# Patient Record
Sex: FEMALE | Race: WHITE | URBAN - METROPOLITAN AREA
[De-identification: names, ages, dates, MRNs, and addresses within clinical notes are randomized per-mention and may not be internally consistent; named-entity substitution may affect disease eponyms.]

---

## 2023-06-07 ENCOUNTER — TELEPHONE (OUTPATIENT)
Dept: OBGYN CLINIC | Facility: CLINIC | Age: 28
End: 2023-06-07

## 2023-06-28 ENCOUNTER — OFFICE VISIT (OUTPATIENT)
Dept: FAMILY MEDICINE CLINIC | Facility: CLINIC | Age: 28
End: 2023-06-28

## 2023-06-28 ENCOUNTER — LAB ENCOUNTER (OUTPATIENT)
Dept: LAB | Age: 28
End: 2023-06-28
Attending: FAMILY MEDICINE
Payer: COMMERCIAL

## 2023-06-28 VITALS
HEART RATE: 86 BPM | DIASTOLIC BLOOD PRESSURE: 69 MMHG | BODY MASS INDEX: 20.32 KG/M2 | HEIGHT: 64 IN | SYSTOLIC BLOOD PRESSURE: 101 MMHG | WEIGHT: 119 LBS

## 2023-06-28 DIAGNOSIS — E06.3 HYPOTHYROIDISM DUE TO HASHIMOTO'S THYROIDITIS: Primary | ICD-10-CM

## 2023-06-28 DIAGNOSIS — Z13.220 LIPID SCREENING: ICD-10-CM

## 2023-06-28 DIAGNOSIS — E06.3 HYPOTHYROIDISM DUE TO HASHIMOTO'S THYROIDITIS: ICD-10-CM

## 2023-06-28 DIAGNOSIS — Z13.21 ENCOUNTER FOR VITAMIN DEFICIENCY SCREENING: ICD-10-CM

## 2023-06-28 DIAGNOSIS — E03.8 HYPOTHYROIDISM DUE TO HASHIMOTO'S THYROIDITIS: ICD-10-CM

## 2023-06-28 DIAGNOSIS — N92.6 IRREGULAR MENSES: ICD-10-CM

## 2023-06-28 DIAGNOSIS — E03.8 HYPOTHYROIDISM DUE TO HASHIMOTO'S THYROIDITIS: Primary | ICD-10-CM

## 2023-06-28 DIAGNOSIS — Z13.1 DIABETES MELLITUS SCREENING: ICD-10-CM

## 2023-06-28 LAB
ALBUMIN SERPL-MCNC: 4.6 G/DL (ref 3.4–5)
ALBUMIN/GLOB SERPL: 1.4 {RATIO} (ref 1–2)
ALP LIVER SERPL-CCNC: 56 U/L
ALT SERPL-CCNC: 36 U/L
ANION GAP SERPL CALC-SCNC: 9 MMOL/L (ref 0–18)
AST SERPL-CCNC: 20 U/L (ref 15–37)
BILIRUB SERPL-MCNC: 1 MG/DL (ref 0.1–2)
BUN BLD-MCNC: 12 MG/DL (ref 7–18)
BUN/CREAT SERPL: 16.9 (ref 10–20)
CALCIUM BLD-MCNC: 9.9 MG/DL (ref 8.5–10.1)
CHLORIDE SERPL-SCNC: 106 MMOL/L (ref 98–112)
CHOLEST SERPL-MCNC: 190 MG/DL (ref ?–200)
CO2 SERPL-SCNC: 24 MMOL/L (ref 21–32)
CREAT BLD-MCNC: 0.71 MG/DL
EST. AVERAGE GLUCOSE BLD GHB EST-MCNC: 100 MG/DL (ref 68–126)
FASTING PATIENT LIPID ANSWER: YES
FASTING STATUS PATIENT QL REPORTED: YES
GFR SERPLBLD BASED ON 1.73 SQ M-ARVRAT: 118 ML/MIN/1.73M2 (ref 60–?)
GLOBULIN PLAS-MCNC: 3.2 G/DL (ref 2.8–4.4)
GLUCOSE BLD-MCNC: 74 MG/DL (ref 70–99)
HBA1C MFR BLD: 5.1 % (ref ?–5.7)
HDLC SERPL-MCNC: 68 MG/DL (ref 40–59)
LDLC SERPL CALC-MCNC: 115 MG/DL (ref ?–100)
NONHDLC SERPL-MCNC: 122 MG/DL (ref ?–130)
OSMOLALITY SERPL CALC.SUM OF ELEC: 286 MOSM/KG (ref 275–295)
POTASSIUM SERPL-SCNC: 3.8 MMOL/L (ref 3.5–5.1)
PROT SERPL-MCNC: 7.8 G/DL (ref 6.4–8.2)
SODIUM SERPL-SCNC: 139 MMOL/L (ref 136–145)
TRIGL SERPL-MCNC: 36 MG/DL (ref 30–149)
TSI SER-ACNC: 0.03 MIU/ML (ref 0.36–3.74)
VIT D+METAB SERPL-MCNC: 78.2 NG/ML (ref 30–100)
VLDLC SERPL CALC-MCNC: 6 MG/DL (ref 0–30)

## 2023-06-28 PROCEDURE — 3074F SYST BP LT 130 MM HG: CPT | Performed by: FAMILY MEDICINE

## 2023-06-28 PROCEDURE — 36415 COLL VENOUS BLD VENIPUNCTURE: CPT

## 2023-06-28 PROCEDURE — 80061 LIPID PANEL: CPT

## 2023-06-28 PROCEDURE — 84443 ASSAY THYROID STIM HORMONE: CPT

## 2023-06-28 PROCEDURE — 80053 COMPREHEN METABOLIC PANEL: CPT

## 2023-06-28 PROCEDURE — 99385 PREV VISIT NEW AGE 18-39: CPT | Performed by: FAMILY MEDICINE

## 2023-06-28 PROCEDURE — 3078F DIAST BP <80 MM HG: CPT | Performed by: FAMILY MEDICINE

## 2023-06-28 PROCEDURE — 3008F BODY MASS INDEX DOCD: CPT | Performed by: FAMILY MEDICINE

## 2023-06-28 PROCEDURE — 82306 VITAMIN D 25 HYDROXY: CPT

## 2023-06-28 PROCEDURE — 83036 HEMOGLOBIN GLYCOSYLATED A1C: CPT

## 2023-06-28 RX ORDER — LEVOTHYROXINE SODIUM 0.1 MG/1
100 TABLET ORAL
Qty: 90 TABLET | Refills: 3 | COMMUNITY
Start: 2023-06-28

## 2023-06-28 RX ORDER — MULTIVIT-MIN/IRON/FOLIC ACID/K 18-600-40
CAPSULE ORAL
COMMUNITY

## 2023-06-28 RX ORDER — LEVOTHYROXINE SODIUM 0.1 MG/1
100 TABLET ORAL
COMMUNITY
End: 2023-06-28 | Stop reason: ALTCHOICE

## 2023-06-29 DIAGNOSIS — E06.3 HYPOTHYROIDISM DUE TO HASHIMOTO'S THYROIDITIS: Primary | ICD-10-CM

## 2023-06-29 DIAGNOSIS — E03.8 HYPOTHYROIDISM DUE TO HASHIMOTO'S THYROIDITIS: Primary | ICD-10-CM

## 2023-06-29 RX ORDER — LEVOTHYROXINE SODIUM 88 UG/1
88 TABLET ORAL
Qty: 90 TABLET | Refills: 0 | Status: SHIPPED | OUTPATIENT
Start: 2023-06-29 | End: 2023-09-27

## 2023-07-24 ENCOUNTER — OFFICE VISIT (OUTPATIENT)
Dept: OBGYN CLINIC | Facility: CLINIC | Age: 28
End: 2023-07-24
Payer: COMMERCIAL

## 2023-07-24 VITALS
WEIGHT: 121 LBS | SYSTOLIC BLOOD PRESSURE: 100 MMHG | BODY MASS INDEX: 20.66 KG/M2 | HEIGHT: 64 IN | DIASTOLIC BLOOD PRESSURE: 60 MMHG

## 2023-07-24 DIAGNOSIS — Z31.69 ENCOUNTER FOR PRECONCEPTION CONSULTATION: Primary | ICD-10-CM

## 2023-07-24 NOTE — PROGRESS NOTES
New Patient GYN History and Physical  [facility]    CHIEF COMPLAINT:  Patient presents with:  Establish Care: Has some questions on if her medications are okay to take while trying to conceive. Fertility  HISTORY OF PRESENT ILLNESS:   Elizabeth Borja is a 33year old female [facility]  who presents for    Fertility planning    G0  Trying for pregnancy now - started this month. Has either been on birth control, or been \"pulling out\"    Last month of birth control was April. Periods have been irregular  First month - may - 2 days  For the first 2 months, had some random spotting - checo dark blood for a day or 2, never enough to need a tampon  Then 6 weeks before next period end of  - 4 days    Was on birth control x 11 years. Been with  x 10 years,  2 years ago  No problems with sex    No h/o STI    Last pap smear 2021 - normal, no h/o abnormals    Stopped spironolactone same time as stopped the birth control. Also stopped tretinoin. Endocrinologist had her on metformin for about 1.5 years.       PAST MEDICAL HISTORY:   Past Medical History:   Diagnosis Date    Acne     Hypothyroidism         PAST SURGICAL HISTORY:   Past Surgical History:   Procedure Laterality Date    Patient denies any surgical history          PAST OB HISTORY:  OB History    Para Term  AB Living   0 0 0 0 0 0   SAB IAB Ectopic Multiple Live Births   0 0 0 0 0       CURRENT MEDICATIONS:      Current Outpatient Medications:     Prenatal MV-Min-Fe Fum-FA-DHA (PRENATAL 1 OR), Take by mouth., Disp: , Rfl:     Cholecalciferol (VITAMIN D) 50 MCG ( UT) Oral Cap, Take by mouth., Disp: , Rfl:     Multiple Vitamin (MULTIVITAMIN OR), Take by mouth As Directed., Disp: , Rfl:     levothyroxine (SYNTHROID) 100 MCG Oral Tab, Take 1 tablet (100 mcg total) by mouth before breakfast., Disp: 90 tablet, Rfl: 3    levothyroxine (SYNTHROID) 88 MCG Oral Tab, Take 1 tablet (88 mcg total) by mouth before breakfast. (Patient not taking: Reported on 7/24/2023), Disp: 90 tablet, Rfl: 0    tretinoin 0.025 % External Cream, Apply to affected area at bedtime apply a pea sized amount to the face every night (Patient not taking: Reported on 7/24/2023), Disp: , Rfl:     ALLERGIES:    Sulfa Antibiotics       HIVES    SOCIAL HISTORY:  Social History    Socioeconomic History      Marital status:     Tobacco Use      Smoking status: Never      Smokeless tobacco: Never    Vaping Use      Vaping Use: Never used    Substance and Sexual Activity      Alcohol use: Yes        Comment: socially on weekends      Drug use: Never      Sexual activity: Yes    Other Topics      Concerns:        Blood Transfusions: No      FAMILY HISTORY:  Family History   Problem Relation Age of Onset    Hypertension Father     Other (hashimotos) Father     Other (Other) Mother     No Known Problems Sister      ASSESSMENTS:  PHYSICAL EXAM:   Patient's last menstrual period was 06/29/2023 (exact date). 07/24/23  1219   BP: 100/60   Weight: 121 lb (54.9 kg)   Height: 64\"     CONSTITUTIONAL: Awake, alert, cooperative, no apparent distress, and appears stated age   NECK: Supple, symmetrical, trachea midline, no adenopathy, thyroid symmetric, not enlarged and no tenderness  LUNGS: No excess work of breathing  MUSCULOSKELETAL: There is no redness, warmth, or swelling of the joints. Tone is normal.  NEUROLOGIC: Patient is awake, alert and oriented to name, place and time. Casual gait is normal.  SKIN: no bruising or bleeding and no rashes  PSYCHIATRIC: Behavior:  Appropriate  Mood:  appropriate  ASSESSMENT AND PLAN:  1. Encounter for preconception consultation  - reveiwed routine preconception guidance - period tracking, healthy diet, regular exercise, avoidaince of alcohol / other substances. Reviewed medications.  I recommend that she decrease the doze of synthroid (as her endocrinologist recommended.) she has stopped the other medications that are contra-indicated during pregnancy.   follow up as needed, or in 6-12 months if no success  Total face to face time was 30 min, more than 50% of the time was spent in counseling and/or coordination of care related to fertility Benji Lawton, DO

## 2023-08-31 ENCOUNTER — MED REC SCAN ONLY (OUTPATIENT)
Dept: FAMILY MEDICINE CLINIC | Facility: CLINIC | Age: 28
End: 2023-08-31

## 2023-08-31 PROBLEM — E03.8 HYPOTHYROIDISM DUE TO HASHIMOTO'S THYROIDITIS: Status: ACTIVE | Noted: 2023-08-31

## 2023-08-31 PROBLEM — E06.3 HYPOTHYROIDISM DUE TO HASHIMOTO'S THYROIDITIS: Status: ACTIVE | Noted: 2023-08-31

## 2023-08-31 PROBLEM — E04.2 MULTINODULAR THYROID: Status: ACTIVE | Noted: 2023-08-31

## 2023-09-13 ENCOUNTER — OFFICE VISIT (OUTPATIENT)
Dept: OBGYN CLINIC | Facility: CLINIC | Age: 28
End: 2023-09-13
Payer: COMMERCIAL

## 2023-09-13 ENCOUNTER — LAB ENCOUNTER (OUTPATIENT)
Dept: LAB | Facility: REFERENCE LAB | Age: 28
End: 2023-09-13
Attending: OBSTETRICS & GYNECOLOGY
Payer: COMMERCIAL

## 2023-09-13 VITALS
HEIGHT: 64 IN | BODY MASS INDEX: 21.45 KG/M2 | WEIGHT: 125.63 LBS | SYSTOLIC BLOOD PRESSURE: 112 MMHG | DIASTOLIC BLOOD PRESSURE: 70 MMHG

## 2023-09-13 DIAGNOSIS — Z32.01 PREGNANCY EXAMINATION OR TEST, POSITIVE RESULT: Primary | ICD-10-CM

## 2023-09-13 DIAGNOSIS — Z32.01 PREGNANCY EXAMINATION OR TEST, POSITIVE RESULT: ICD-10-CM

## 2023-09-13 LAB
B-HCG SERPL-ACNC: 3206 MIU/ML
CONTROL LINE PRESENT WITH A CLEAR BACKGROUND (YES/NO): YES YES/NO
KIT LOT #: NORMAL NUMERIC
PREGNANCY TEST, URINE: POSITIVE

## 2023-09-13 PROCEDURE — 3008F BODY MASS INDEX DOCD: CPT | Performed by: OBSTETRICS & GYNECOLOGY

## 2023-09-13 PROCEDURE — 3074F SYST BP LT 130 MM HG: CPT | Performed by: OBSTETRICS & GYNECOLOGY

## 2023-09-13 PROCEDURE — 84702 CHORIONIC GONADOTROPIN TEST: CPT

## 2023-09-13 PROCEDURE — 81025 URINE PREGNANCY TEST: CPT | Performed by: OBSTETRICS & GYNECOLOGY

## 2023-09-13 PROCEDURE — 36415 COLL VENOUS BLD VENIPUNCTURE: CPT

## 2023-09-13 PROCEDURE — 99214 OFFICE O/P EST MOD 30 MIN: CPT | Performed by: OBSTETRICS & GYNECOLOGY

## 2023-09-13 PROCEDURE — 3078F DIAST BP <80 MM HG: CPT | Performed by: OBSTETRICS & GYNECOLOGY

## 2023-09-15 ENCOUNTER — LAB ENCOUNTER (OUTPATIENT)
Dept: LAB | Age: 28
End: 2023-09-15
Attending: OBSTETRICS & GYNECOLOGY
Payer: COMMERCIAL

## 2023-09-15 DIAGNOSIS — Z32.01 PREGNANCY EXAMINATION OR TEST, POSITIVE RESULT: ICD-10-CM

## 2023-09-15 LAB — B-HCG SERPL-ACNC: 7112 MIU/ML

## 2023-09-15 PROCEDURE — 84702 CHORIONIC GONADOTROPIN TEST: CPT

## 2023-09-15 PROCEDURE — 36415 COLL VENOUS BLD VENIPUNCTURE: CPT

## 2023-09-18 ENCOUNTER — TELEPHONE (OUTPATIENT)
Dept: OBGYN CLINIC | Facility: CLINIC | Age: 28
End: 2023-09-18

## 2023-09-18 DIAGNOSIS — Z32.00 ENCOUNTER FOR CONFIRMATION OF PREGNANCY TEST RESULT WITH PHYSICAL EXAMINATION: Primary | ICD-10-CM

## 2023-09-21 ENCOUNTER — ULTRASOUND ENCOUNTER (OUTPATIENT)
Dept: OBGYN CLINIC | Facility: CLINIC | Age: 28
End: 2023-09-21
Payer: COMMERCIAL

## 2023-09-21 DIAGNOSIS — Z32.00 ENCOUNTER FOR CONFIRMATION OF PREGNANCY TEST RESULT WITH PHYSICAL EXAMINATION: ICD-10-CM

## 2023-09-21 PROCEDURE — 76817 TRANSVAGINAL US OBSTETRIC: CPT | Performed by: OBSTETRICS & GYNECOLOGY

## 2023-09-26 DIAGNOSIS — O36.80X0 ENCOUNTER TO DETERMINE FETAL VIABILITY OF PREGNANCY, SINGLE OR UNSPECIFIED FETUS: Primary | ICD-10-CM

## 2023-10-05 ENCOUNTER — ULTRASOUND ENCOUNTER (OUTPATIENT)
Dept: OBGYN CLINIC | Facility: CLINIC | Age: 28
End: 2023-10-05
Payer: COMMERCIAL

## 2023-10-05 DIAGNOSIS — O36.80X0 ENCOUNTER TO DETERMINE FETAL VIABILITY OF PREGNANCY, SINGLE OR UNSPECIFIED FETUS: ICD-10-CM

## 2023-10-05 PROCEDURE — 76817 TRANSVAGINAL US OBSTETRIC: CPT | Performed by: OBSTETRICS & GYNECOLOGY

## 2023-10-26 ENCOUNTER — NURSE ONLY (OUTPATIENT)
Dept: OBGYN CLINIC | Facility: CLINIC | Age: 28
End: 2023-10-26

## 2023-10-26 ENCOUNTER — LAB ENCOUNTER (OUTPATIENT)
Dept: LAB | Facility: REFERENCE LAB | Age: 28
End: 2023-10-26
Attending: OBSTETRICS & GYNECOLOGY

## 2023-10-26 DIAGNOSIS — E03.8 HYPOTHYROIDISM DUE TO HASHIMOTO'S THYROIDITIS: ICD-10-CM

## 2023-10-26 DIAGNOSIS — E06.3 HYPOTHYROIDISM DUE TO HASHIMOTO'S THYROIDITIS: ICD-10-CM

## 2023-10-26 DIAGNOSIS — Z34.01 ENCOUNTER FOR SUPERVISION OF NORMAL FIRST PREGNANCY IN FIRST TRIMESTER: ICD-10-CM

## 2023-10-26 DIAGNOSIS — Z34.01 ENCOUNTER FOR SUPERVISION OF NORMAL FIRST PREGNANCY IN FIRST TRIMESTER: Primary | ICD-10-CM

## 2023-10-26 LAB
ANTIBODY SCREEN: NEGATIVE
BASOPHILS # BLD AUTO: 0.06 X10(3) UL (ref 0–0.2)
BASOPHILS NFR BLD AUTO: 0.7 %
DEPRECATED RDW RBC AUTO: 39.8 FL (ref 35.1–46.3)
EOSINOPHIL # BLD AUTO: 0.12 X10(3) UL (ref 0–0.7)
EOSINOPHIL NFR BLD AUTO: 1.4 %
ERYTHROCYTE [DISTWIDTH] IN BLOOD BY AUTOMATED COUNT: 12.6 % (ref 11–15)
HBV SURFACE AG SER-ACNC: <0.1 [IU]/L
HBV SURFACE AG SERPL QL IA: NONREACTIVE
HCT VFR BLD AUTO: 37.4 %
HCV AB SERPL QL IA: NONREACTIVE
HGB BLD-MCNC: 12.8 G/DL
IMM GRANULOCYTES # BLD AUTO: 0.02 X10(3) UL (ref 0–1)
IMM GRANULOCYTES NFR BLD: 0.2 %
LYMPHOCYTES # BLD AUTO: 2.17 X10(3) UL (ref 1–4)
LYMPHOCYTES NFR BLD AUTO: 25.4 %
MCH RBC QN AUTO: 29.5 PG (ref 26–34)
MCHC RBC AUTO-ENTMCNC: 34.2 G/DL (ref 31–37)
MCV RBC AUTO: 86.2 FL
MONOCYTES # BLD AUTO: 0.51 X10(3) UL (ref 0.1–1)
MONOCYTES NFR BLD AUTO: 6 %
NEUTROPHILS # BLD AUTO: 5.68 X10 (3) UL (ref 1.5–7.7)
NEUTROPHILS # BLD AUTO: 5.68 X10(3) UL (ref 1.5–7.7)
NEUTROPHILS NFR BLD AUTO: 66.3 %
PLATELET # BLD AUTO: 233 10(3)UL (ref 150–450)
RBC # BLD AUTO: 4.34 X10(6)UL
RH BLOOD TYPE: POSITIVE
RUBV IGG SER QL: POSITIVE
RUBV IGG SER-ACNC: 60.2 IU/ML (ref 10–?)
T4 FREE SERPL-MCNC: 1.2 NG/DL (ref 0.8–1.7)
TSI SER-ACNC: 2.3 MIU/ML (ref 0.36–3.74)
WBC # BLD AUTO: 8.6 X10(3) UL (ref 4–11)

## 2023-10-26 PROCEDURE — 83020 HEMOGLOBIN ELECTROPHORESIS: CPT

## 2023-10-26 PROCEDURE — 84439 ASSAY OF FREE THYROXINE: CPT

## 2023-10-26 PROCEDURE — 83021 HEMOGLOBIN CHROMOTOGRAPHY: CPT

## 2023-10-26 PROCEDURE — 85025 COMPLETE CBC W/AUTO DIFF WBC: CPT

## 2023-10-26 PROCEDURE — 87086 URINE CULTURE/COLONY COUNT: CPT

## 2023-10-26 PROCEDURE — 36415 COLL VENOUS BLD VENIPUNCTURE: CPT

## 2023-10-26 PROCEDURE — 84443 ASSAY THYROID STIM HORMONE: CPT

## 2023-10-26 PROCEDURE — 86901 BLOOD TYPING SEROLOGIC RH(D): CPT

## 2023-10-26 PROCEDURE — 86900 BLOOD TYPING SEROLOGIC ABO: CPT

## 2023-10-26 PROCEDURE — 87389 HIV-1 AG W/HIV-1&-2 AB AG IA: CPT

## 2023-10-26 PROCEDURE — 86803 HEPATITIS C AB TEST: CPT

## 2023-10-26 PROCEDURE — 86850 RBC ANTIBODY SCREEN: CPT

## 2023-10-26 PROCEDURE — 86780 TREPONEMA PALLIDUM: CPT

## 2023-10-26 PROCEDURE — 86762 RUBELLA ANTIBODY: CPT

## 2023-10-26 PROCEDURE — 87340 HEPATITIS B SURFACE AG IA: CPT

## 2023-10-26 RX ORDER — LEVOTHYROXINE SODIUM 88 UG/1
88 TABLET ORAL
COMMUNITY

## 2023-10-26 RX ORDER — CLINDAMYCIN PHOSPHATE 10 MG/G
GEL TOPICAL
COMMUNITY
Start: 2023-09-21

## 2023-10-26 RX ORDER — AZELAIC ACID 0.15 G/G
GEL TOPICAL
COMMUNITY
Start: 2023-10-17

## 2023-10-26 NOTE — PROGRESS NOTES
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 Pregnancy Confirmation apt with: RAGHAVENDRA    LMP: 2023    US: 10/05/2023 at 8w 1d    Working JONAS:  05/15/2024    Pre  BMI:   21.56    Medical Hx significant for: Hypothyroid    Obstetrical Hx significant for: 1st pregnancy    Surgical Hx significant for: denies    EPDS score: 2    OUD Screening: Patient has answered NO to 5p questions and has no  risk factors. Patient given \"What Pregnant Women Need to Know\" handout. Educational material reviewed with patient: Prenatal care, nutrition, weight gain recommendations, travel, exercise, intercourse, pregnancy changes, safe medications, pregnancy and work, fetal movement, labor and  labor, warning signs, food safety, tdap, cord blood, breastfeeding, circumcision, and Group B strep. Pt agrees to blood transfusion if needed: yes    PN labs ordered     Optional genetic screening discussed. Pt desires [place] and Foresight carrier screen:    [facility] Media Policy: Reviewed and verbalized understanding.      NOB appt: 2023 with RD    Lab appt: today    Vaccines: received flu and COVID    ASA protocol :  n/a    SODH:  completed

## 2023-10-27 LAB — T PALLIDUM AB SER QL: NEGATIVE

## 2023-11-01 LAB
HGB A2 MFR BLD: 2.7 % (ref 1.5–3.5)
HGB PNL BLD ELPH: 97.3 % (ref 95.5–100)

## 2023-11-04 ENCOUNTER — INITIAL PRENATAL (OUTPATIENT)
Dept: OBGYN CLINIC | Facility: CLINIC | Age: 28
End: 2023-11-04
Payer: COMMERCIAL

## 2023-11-04 VITALS
BODY MASS INDEX: 21 KG/M2 | DIASTOLIC BLOOD PRESSURE: 68 MMHG | HEIGHT: 64 IN | SYSTOLIC BLOOD PRESSURE: 92 MMHG | WEIGHT: 123 LBS

## 2023-11-04 DIAGNOSIS — Z34.00 SUPERVISION OF NORMAL FIRST PREGNANCY, ANTEPARTUM: Primary | ICD-10-CM

## 2023-11-04 PROCEDURE — 3074F SYST BP LT 130 MM HG: CPT | Performed by: OBSTETRICS & GYNECOLOGY

## 2023-11-04 PROCEDURE — 3078F DIAST BP <80 MM HG: CPT | Performed by: OBSTETRICS & GYNECOLOGY

## 2023-11-04 PROCEDURE — 3008F BODY MASS INDEX DOCD: CPT | Performed by: OBSTETRICS & GYNECOLOGY

## 2023-11-04 NOTE — PROGRESS NOTES
[facility]  Obstetrics and Gynecology  History & Physical    CC: Patient is here to establish prenatal care     Subjective:     HPI:  Bailey Kim is a 33year old [facility] female at [address] [place] [address] who presents today to establish prenatal care. Patient reports some nausea still lingering. Denies vaginal bleeding, abdominal/pelvic pain. Slightly constipated. + stuffy nose but no other symptoms that make her think she is sick. LMP: Patient's last menstrual period was 2023 (exact date).   JONAS:  Estimated Date of Delivery: 5/15/24    OB:  OB History    Para Term  AB Living   1 0 0 0 0 0   SAB IAB Ectopic Multiple Live Births   0 0 0 0 0      # Outcome Date GA Lbr Marco Antonio/2nd Weight Sex Delivery Anes PTL Lv   1 Current                  GYN:   Menarche: 14 yrs old (2023 12:22 PM)  Period Cycle (Days): irregular since geting off OCPs (2023 12:22 PM)  Period Duration (Days): 2-4 days (2023 12:22 PM)  Period Flow: light-spotting (2023 12:22 PM)  Date When Birth Control Last Used: OCPs last used in 2023 (2023 12:22 PM)  Hx Prior Abnormal Pap: No (2023 12:22 PM)  Pap Date: 12/15/21 (2023 12:22 PM)  Pap Result Notes: WNL (2023 12:22 PM)    PMH:   Past Medical History:   Diagnosis Date    Acne     Hypothyroidism        PSH:    Past Surgical History:   Procedure Laterality Date    PATIENT DENIES ANY SURGICAL HISTORY         MEDS:    Current Outpatient Medications:     Azelaic Acid 15 % External Gel, , Disp: , Rfl:     levothyroxine 88 MCG Oral Tab, Take 1 tablet (88 mcg total) by mouth before breakfast., Disp: , Rfl:     Prenatal MV-Min-Fe Fum-FA-DHA (PRENATAL 1 OR), Take by mouth., Disp: , Rfl:     Cholecalciferol (VITAMIN D) 50 MCG (2000 UT) Oral Cap, Take by mouth., Disp: , Rfl:     Clindamycin Phosphate 1 % External Gel, Apply to face as a spot treatment every morning (Patient not taking: Reported on 2023), Disp: , Rfl:     levothyroxine (SYNTHROID) 100 MCG Oral Tab, Take 88 mcg by mouth before breakfast. (Patient not taking: Reported on 11/4/2023), Disp: 90 tablet, Rfl: 3    Allergies:      Sulfa Antibiotics       HIVES    Immunizations:    Immunization History  Administered            Date(s) Administered    >=3 YRS TRI  MULTIDOSE VIAL (74049) FLU CLINIC                          10/12/2013      DTAP INFANRIX         03/30/1994 05/26/1994 08/05/1994 07/26/1995 03/12/1999      FLUZONE 6 months and older PFS 0.5 ml (17547)                          10/11/2017  09/10/2018  09/11/2018                            12/08/2020  10/02/2021  03/04/2022      HEP A,Ped/Adol,(2 Dose)                          01/25/2007 08/01/2008      HEP B, Adult          07/26/1995 08/30/1995 01/31/1996      HIB (HbOC)            04/17/1995      HPV (Gardasil)        08/01/2008  10/17/2008  02/20/2009      IPV                   03/30/1994 05/26/1994 08/05/1994 03/12/1999      Influenza             10/09/2015  10/24/2019      MMR                   04/17/1995 03/12/1999      Meningococcal-Menveo 2month-55yr                          10/12/2005  08/18/2011      TDAP                  01/16/2006 06/28/2012 03/04/2022      Varicella             07/26/1995 01/25/2007      Family Hx:      Family History   Problem Relation Age of Onset    Thyroid Disorder Father     Hypertension Father     Thyroid Disorder Mother     Osteoporosis Maternal Grandmother     Obesity Maternal Grandfather     No Known Problems Paternal Grandmother     Cancer Paternal Grandfather     Other (lymphoma) Paternal Grandfather     No Known Problems Sister        SocialHx:        Social History     Socioeconomic History    Marital status:    Tobacco Use    Smoking status: Never     Passive exposure: Never    Smokeless tobacco: Never   Vaping Use    Vaping Use: Never used   Substance and Sexual Activity    Alcohol use: Not Currently     Comment: socially on weekends    Drug use: Never    Sexual activity: Yes   Other Topics Concern    Blood Transfusions No   Social History Narrative    No abuse   Social Determinants of Health  Financial Resource Strain: Low Risk  (10/30/2023)      Financial Resource Strain          Difficulty of Paying Living Expenses: Not hard at all          Med Affordability: No  Food Insecurity: No Food Insecurity (10/30/2023)      Food Insecurity          Food Insecurity: Never true  Transportation Needs: No Transportation Needs (10/30/2023)      Transportation Needs          Lack of Transportation: No  Stress: No Stress Concern Present (10/30/2023)      Stress          Feeling of Stress : No  Housing Stability: Low Risk  (10/30/2023)      Housing Stability          Housing Instability: No       Review of Systems:  General: no complaints  Eyes: no complaints  Respiratory: no complaints  Cardiovascular: no complaints  GI: no complaints  : no complaints See HPI  Hematological/lymphatic: no complaints  Breast: no complaints  Psychiatric: no complaints  Endocrine:no complaints  Neurological: no complaints  Immunological: no complaints  Musculoskeletal:no complaints    Objective:      23  0908   BP: 92/68       GENERAL: well developed, well nourished, in no apparent distress, alert and orientated X 3  PSYCH: mood and affect stable   SKIN: no rashes, no lesions  HEENT: normal  LUNGS: respiration unlabored  CARDIOVASCULAR: no peripheral edema or varicosities, skin warm and dry    ABDOMEN: Soft, non distended; non tender, no masses  EXTREMITIES:  Nontender without edema    Fetal Well Being Assessment:    Bedside doppler: 162 bpm    Assessment/Plan:     Prasanth Humphrey is a 33year old  female at 14w4d by 1st trimester US not c/w LMP who presents today for New OB appt.     Patient Active Problem List:     Hypothyroidism due to Hashimoto's thyroiditis     Multinodular thyroid        Prenatal care  - prenatal labs reviewed  - genetic testing collected last week and pending.  - continue PNV daily         RTC in 4 weeks     Cinthia Pagan DO  [address] [facility]

## 2023-11-06 ENCOUNTER — TELEPHONE (OUTPATIENT)
Dept: OBGYN CLINIC | Facility: CLINIC | Age: 28
End: 2023-11-06

## 2023-11-13 ENCOUNTER — TELEPHONE (OUTPATIENT)
Dept: OBGYN CLINIC | Facility: CLINIC | Age: 28
End: 2023-11-13

## 2023-11-13 PROBLEM — Z14.8 GENETIC CARRIER: Status: ACTIVE | Noted: 2023-11-13

## 2023-11-13 NOTE — TELEPHONE ENCOUNTER
Called pt informed of + Foresight carrier screen Nephrotic Syndrome, informed to have FOB tested. Sent pt inform via email from [address] and pt agrees.

## 2023-11-17 PROBLEM — Z13.79 GENETIC TESTING: Status: ACTIVE | Noted: 2023-11-17

## 2023-12-02 ENCOUNTER — ROUTINE PRENATAL (OUTPATIENT)
Dept: OBGYN CLINIC | Facility: CLINIC | Age: 28
End: 2023-12-02
Payer: COMMERCIAL

## 2023-12-02 VITALS
HEIGHT: 64 IN | WEIGHT: 126 LBS | DIASTOLIC BLOOD PRESSURE: 58 MMHG | SYSTOLIC BLOOD PRESSURE: 92 MMHG | BODY MASS INDEX: 21.51 KG/M2

## 2023-12-02 DIAGNOSIS — Z34.00 SUPERVISION OF NORMAL FIRST PREGNANCY, ANTEPARTUM: Primary | ICD-10-CM

## 2023-12-02 PROCEDURE — 3078F DIAST BP <80 MM HG: CPT | Performed by: OBSTETRICS & GYNECOLOGY

## 2023-12-02 PROCEDURE — 3008F BODY MASS INDEX DOCD: CPT | Performed by: OBSTETRICS & GYNECOLOGY

## 2023-12-02 PROCEDURE — 3074F SYST BP LT 130 MM HG: CPT | Performed by: OBSTETRICS & GYNECOLOGY

## 2023-12-02 NOTE — PROGRESS NOTES
33year old  at 16w3d     Contractions: No  VB: No  LOF: No  Fetal movement: Not yet    Some constipation. Only occasional nausea.     Return OB  Pre- Care: UTD.   - will sched anatomy US 20+ wks EGA  - genetic testing for  - results still pending  Patient Active Problem List   Diagnosis    Hypothyroidism due to Hashimoto's thyroiditis    Multinodular thyroid    Carrier of nephrotic syndrome    Negative prequel  pregnancy       - Return to clinic in: 4 weeks    [place]at[place]zahira Pickering DO

## 2023-12-04 ENCOUNTER — TELEPHONE (OUTPATIENT)
Dept: OBGYN CLINIC | Facility: CLINIC | Age: 28
End: 2023-12-04

## 2023-12-04 NOTE — TELEPHONE ENCOUNTER
Partner negative for Nephrotic Syndrome, NPHS2-related,. Called Anne Godwin informed of negative foresight carrier screen. He was tested for  Nephrotic syndrome, NPHS2-related. He will relay the information to pt.

## 2023-12-29 RX ORDER — LEVOTHYROXINE SODIUM 88 UG/1
88 TABLET ORAL DAILY
Qty: 90 TABLET | Refills: 0 | OUTPATIENT
Start: 2023-12-29

## 2023-12-30 NOTE — TELEPHONE ENCOUNTER
Duplicate Refill Request / Refill too soon.   Per med list last ref synthroid 100 mcg on 6-28-23 # 90 + 3    levothyroxine (SYNTHROID) 100 MCG Oral Tab 90 tablet 3 6/28/2023

## 2024-01-04 ENCOUNTER — ULTRASOUND ENCOUNTER (OUTPATIENT)
Dept: OBGYN CLINIC | Facility: CLINIC | Age: 29
End: 2024-01-04
Payer: COMMERCIAL

## 2024-01-04 ENCOUNTER — ROUTINE PRENATAL (OUTPATIENT)
Dept: OBGYN CLINIC | Facility: CLINIC | Age: 29
End: 2024-01-04
Payer: COMMERCIAL

## 2024-01-04 VITALS
WEIGHT: 130 LBS | DIASTOLIC BLOOD PRESSURE: 58 MMHG | SYSTOLIC BLOOD PRESSURE: 98 MMHG | BODY MASS INDEX: 22.2 KG/M2 | HEIGHT: 64 IN

## 2024-01-04 DIAGNOSIS — Z34.00 SUPERVISION OF NORMAL FIRST PREGNANCY, ANTEPARTUM: ICD-10-CM

## 2024-01-04 DIAGNOSIS — Z34.00 SUPERVISION OF NORMAL FIRST PREGNANCY, ANTEPARTUM: Primary | ICD-10-CM

## 2024-01-04 PROCEDURE — 3074F SYST BP LT 130 MM HG: CPT | Performed by: OBSTETRICS & GYNECOLOGY

## 2024-01-04 PROCEDURE — 3078F DIAST BP <80 MM HG: CPT | Performed by: OBSTETRICS & GYNECOLOGY

## 2024-01-04 PROCEDURE — 76805 OB US >/= 14 WKS SNGL FETUS: CPT | Performed by: OBSTETRICS & GYNECOLOGY

## 2024-01-04 PROCEDURE — 3008F BODY MASS INDEX DOCD: CPT | Performed by: OBSTETRICS & GYNECOLOGY

## 2024-01-04 RX ORDER — PHENOL 1.4 %
600 AEROSOL, SPRAY (ML) MUCOUS MEMBRANE
COMMUNITY

## 2024-01-04 RX ORDER — VALACYCLOVIR HYDROCHLORIDE 1 G/1
TABLET, FILM COATED ORAL
COMMUNITY
Start: 2023-12-01

## 2024-01-04 RX ORDER — LEVOTHYROXINE SODIUM 88 UG/1
88 TABLET ORAL
Qty: 84 TABLET | Refills: 3 | Status: SHIPPED | OUTPATIENT
Start: 2024-01-04

## 2024-01-04 NOTE — PROGRESS NOTES
28 year old  at 21w1d     Contractions: No  VB: No  LOF: No  Fetal movement: yes      Return OB  Pre- Care: UTD.   - anatomy US today WNL  - plan for 1 hr gtt / cbc next appt  - refills of synthroid sent - check TSH with labs next appt    Patient Active Problem List   Diagnosis    Hypothyroidism due to Hashimoto's thyroiditis    Multinodular thyroid    Carrier of nephrotic syndrome    Negative prequel  pregnancy       - Return to clinic in: 4 weeks    Luisa Mcintyre DO

## 2024-01-05 ENCOUNTER — TELEPHONE (OUTPATIENT)
Dept: OBGYN CLINIC | Facility: CLINIC | Age: 29
End: 2024-01-05

## 2024-01-05 ENCOUNTER — PATIENT MESSAGE (OUTPATIENT)
Dept: OBGYN CLINIC | Facility: CLINIC | Age: 29
End: 2024-01-05

## 2024-01-05 NOTE — TELEPHONE ENCOUNTER
From: Ifrah Perea  To: Luisa Mcintyre  Sent: 1/5/2024 8:54 AM CST  Subject: FMLA Paperwork    Hello Dr. Mcintyre,     Attached is my FMLA paperwork that must be completed by 1/25/24 from my employer. Can you please ensure that this is completed. Thank you!     Below are instructions from my HR department:     o Your health care provider needs to complete all of Section II, pages 2, 3 and 4, including your health care provider’s signature and signature date.  o Please return all four pages of the completed form [id number] to me directly or to my HR department by email ([email or web address]) or fax it directly to [phone].    Please let me know if you have any questions or need any additional information.     -Ifrah Perea

## 2024-01-06 NOTE — TELEPHONE ENCOUNTER
FMLA forms received [employer] message- Logged for processing MyChart sent to patient for auth.

## 2024-01-23 NOTE — TELEPHONE ENCOUNTER
Dr. Mcintyre     *The ACKNOWLEDGE button has been moved to the top right ribbon*    Please sign off on form if you agree to: TEODORA  Starting JONAS 05/15/24 Endin-8 wks delivery recovery time / 1-12 wks maternity leave  (place your signature on the first page only)    -From your Inbasket, Highlight the patient and click Chart   -Double click the 24 Forms Completion telephone encounter  -Scroll down to the Media section   -Click the blue Hyperlink: TEODORA Mcintyre 24  -Click Acknowledge located in the top right ribbon/menu   -Drag the mouse into the blank space of the document and a + sign will appear. Left click to   electronically sign the document.     Thank you,     Keely

## 2024-02-01 ENCOUNTER — LAB ENCOUNTER (OUTPATIENT)
Dept: LAB | Facility: HOSPITAL | Age: 29
End: 2024-02-01
Attending: OBSTETRICS & GYNECOLOGY
Payer: COMMERCIAL

## 2024-02-01 ENCOUNTER — ROUTINE PRENATAL (OUTPATIENT)
Dept: OBGYN CLINIC | Facility: CLINIC | Age: 29
End: 2024-02-01
Payer: COMMERCIAL

## 2024-02-01 VITALS — SYSTOLIC BLOOD PRESSURE: 96 MMHG | BODY MASS INDEX: 23 KG/M2 | WEIGHT: 131.63 LBS | DIASTOLIC BLOOD PRESSURE: 58 MMHG

## 2024-02-01 DIAGNOSIS — Z34.00 SUPERVISION OF NORMAL FIRST PREGNANCY, ANTEPARTUM: ICD-10-CM

## 2024-02-01 DIAGNOSIS — Z34.00 SUPERVISION OF NORMAL FIRST PREGNANCY, ANTEPARTUM: Primary | ICD-10-CM

## 2024-02-01 LAB
DEPRECATED RDW RBC AUTO: 41.7 FL (ref 35.1–46.3)
ERYTHROCYTE [DISTWIDTH] IN BLOOD BY AUTOMATED COUNT: 12.9 % (ref 11–15)
GLUCOSE 1H P GLC SERPL-MCNC: 119 MG/DL
HCT VFR BLD AUTO: 36.5 %
HGB BLD-MCNC: 12.4 G/DL
MCH RBC QN AUTO: 30.1 PG (ref 26–34)
MCHC RBC AUTO-ENTMCNC: 34 G/DL (ref 31–37)
MCV RBC AUTO: 88.6 FL
PLATELET # BLD AUTO: 196 10(3)UL (ref 150–450)
RBC # BLD AUTO: 4.12 X10(6)UL
TSI SER-ACNC: 2.14 MIU/ML (ref 0.55–4.78)
WBC # BLD AUTO: 8.6 X10(3) UL (ref 4–11)

## 2024-02-01 PROCEDURE — 85027 COMPLETE CBC AUTOMATED: CPT

## 2024-02-01 PROCEDURE — 82950 GLUCOSE TEST: CPT

## 2024-02-01 PROCEDURE — 84443 ASSAY THYROID STIM HORMONE: CPT

## 2024-02-01 PROCEDURE — 36415 COLL VENOUS BLD VENIPUNCTURE: CPT

## 2024-02-01 NOTE — PROGRESS NOTES
29 year old  at 25w1d   Was in [place] since last visit and reported gastroenteritis episode which lasted less than 1 day.  All symptoms resolved now.    Contractions: No  VB: No  LOF: No  Fetal movement: yes      Return OB  Pre-Radha Care: UTD. Tdap next visit.  - plan for 1 hr gtt / cbc today  - check TSH      Patient Active Problem List   Diagnosis    Hypothyroidism due to Hashimoto's thyroiditis    Multinodular thyroid    Carrier of nephrotic syndrome - partner negative    Negative prequel  pregnancy       - Return to clinic in: 4 weeks    Tina Mars MD

## 2024-02-27 ENCOUNTER — TELEPHONE (OUTPATIENT)
Dept: OBGYN CLINIC | Facility: CLINIC | Age: 29
End: 2024-02-27

## 2024-03-02 ENCOUNTER — ROUTINE PRENATAL (OUTPATIENT)
Dept: OBGYN CLINIC | Facility: CLINIC | Age: 29
End: 2024-03-02
Payer: COMMERCIAL

## 2024-03-02 DIAGNOSIS — E06.3 HYPOTHYROIDISM DUE TO HASHIMOTO'S THYROIDITIS: ICD-10-CM

## 2024-03-02 DIAGNOSIS — E03.8 HYPOTHYROIDISM DUE TO HASHIMOTO'S THYROIDITIS: ICD-10-CM

## 2024-03-02 DIAGNOSIS — Z36.9 UNSPECIFIED ANTENATAL SCREENING (HCC): ICD-10-CM

## 2024-03-02 DIAGNOSIS — Z34.00 SUPERVISION OF NORMAL FIRST PREGNANCY, ANTEPARTUM (HCC): Primary | ICD-10-CM

## 2024-03-02 NOTE — PROGRESS NOTES
Doing well. No OB complaints. +FM.   TDAP today.   TSH 3rd trimester for next visit.   Reviewed baby mindfulness.     ELANA 3 weeks with growth ultrasound.     Dr. Zechariah De Los Santos MD    [facility]     This note was created by [employer] voice recognition. Errors in content may be related to improper recognition by the system; efforts to review and correct have been done but errors may still exist. Please be advised the primary purpose of this note is for me to communicate medical care. Standard sentence structure is not always used. Medical terminology and medical abbreviations may be used. There may be grammatical, typographical, and automated fill ins that may have errors missed in proofreading.

## 2024-03-20 ENCOUNTER — LAB ENCOUNTER (OUTPATIENT)
Dept: LAB | Facility: REFERENCE LAB | Age: 29
End: 2024-03-20
Attending: OBSTETRICS & GYNECOLOGY
Payer: COMMERCIAL

## 2024-03-20 ENCOUNTER — ULTRASOUND ENCOUNTER (OUTPATIENT)
Dept: OBGYN CLINIC | Facility: CLINIC | Age: 29
End: 2024-03-20
Payer: COMMERCIAL

## 2024-03-20 ENCOUNTER — ROUTINE PRENATAL (OUTPATIENT)
Dept: OBGYN CLINIC | Facility: CLINIC | Age: 29
End: 2024-03-20
Payer: COMMERCIAL

## 2024-03-20 VITALS
SYSTOLIC BLOOD PRESSURE: 92 MMHG | WEIGHT: 131 LBS | HEIGHT: 64 IN | DIASTOLIC BLOOD PRESSURE: 60 MMHG | BODY MASS INDEX: 22.36 KG/M2

## 2024-03-20 DIAGNOSIS — E06.3 HYPOTHYROIDISM DUE TO HASHIMOTO'S THYROIDITIS: ICD-10-CM

## 2024-03-20 DIAGNOSIS — E03.8 HYPOTHYROIDISM DUE TO HASHIMOTO'S THYROIDITIS: ICD-10-CM

## 2024-03-20 DIAGNOSIS — Z36.9 UNSPECIFIED ANTENATAL SCREENING (HCC): ICD-10-CM

## 2024-03-20 DIAGNOSIS — Z34.00 SUPERVISION OF NORMAL FIRST PREGNANCY, ANTEPARTUM (HCC): ICD-10-CM

## 2024-03-20 DIAGNOSIS — Z34.00 SUPERVISION OF NORMAL FIRST PREGNANCY, ANTEPARTUM (HCC): Primary | ICD-10-CM

## 2024-03-20 PROCEDURE — 84439 ASSAY OF FREE THYROXINE: CPT

## 2024-03-20 PROCEDURE — 36415 COLL VENOUS BLD VENIPUNCTURE: CPT

## 2024-03-20 PROCEDURE — 76816 OB US FOLLOW-UP PER FETUS: CPT | Performed by: OBSTETRICS & GYNECOLOGY

## 2024-03-20 PROCEDURE — 84443 ASSAY THYROID STIM HORMONE: CPT

## 2024-03-20 NOTE — PROGRESS NOTES
30 y/o  at 32 W gestation.   +FM, no vaginal bleeding, no LOF.   Had normal usn for growth today.

## 2024-03-21 LAB
T4 FREE SERPL-MCNC: 1.5 NG/DL (ref 0.8–1.7)
TSI SER-ACNC: 1.43 MIU/ML (ref 0.55–4.78)

## 2024-04-02 ENCOUNTER — ROUTINE PRENATAL (OUTPATIENT)
Dept: OBGYN CLINIC | Facility: CLINIC | Age: 29
End: 2024-04-02
Payer: COMMERCIAL

## 2024-04-02 VITALS
HEIGHT: 64 IN | DIASTOLIC BLOOD PRESSURE: 68 MMHG | SYSTOLIC BLOOD PRESSURE: 108 MMHG | WEIGHT: 140.88 LBS | BODY MASS INDEX: 24.05 KG/M2

## 2024-04-02 DIAGNOSIS — Z36.9 UNSPECIFIED ANTENATAL SCREENING (HCC): Primary | ICD-10-CM

## 2024-04-02 NOTE — PROGRESS NOTES
Doing well. No OB complaints. +FM.   Reviewed next steps and answered questions about restless legs, cold medications approved in pregnancy.     ELANA 2 weeks.     Dr. Zechariah De Los Santos MD    [facility]     This note was created by [employer] voice recognition. Errors in content may be related to improper recognition by the system; efforts to review and correct have been done but errors may still exist. Please be advised the primary purpose of this note is for me to communicate medical care. Standard sentence structure is not always used. Medical terminology and medical abbreviations may be used. There may be grammatical, typographical, and automated fill ins that may have errors missed in proofreading.

## 2024-04-10 ENCOUNTER — HOSPITAL ENCOUNTER (OUTPATIENT)
Facility: HOSPITAL | Age: 29
Discharge: HOME | End: 2024-04-10
Attending: OBSTETRICS & GYNECOLOGY | Admitting: OBSTETRICS & GYNECOLOGY
Payer: COMMERCIAL

## 2024-04-10 ENCOUNTER — TELEPHONE (OUTPATIENT)
Dept: OBGYN CLINIC | Facility: CLINIC | Age: 29
End: 2024-04-10

## 2024-04-10 VITALS — DIASTOLIC BLOOD PRESSURE: 81 MMHG | SYSTOLIC BLOOD PRESSURE: 119 MMHG | TEMPERATURE: 98 F | HEART RATE: 90 BPM

## 2024-04-10 DIAGNOSIS — N89.8 VAGINAL DISCHARGE: Primary | ICD-10-CM

## 2024-04-10 PROCEDURE — 99214 OFFICE O/P EST MOD 30 MIN: CPT

## 2024-04-10 PROCEDURE — 59025 FETAL NON-STRESS TEST: CPT

## 2024-04-11 NOTE — PROGRESS NOTES
Discharged to home in stable condition with written and verbal instructions. Patient verbalizes understanding of given information. All questions answered by RN.

## 2024-04-11 NOTE — TELEPHONE ENCOUNTER
Called pt states she was seen at [facility] due to gushing/leaking of fluid, results came back negative, and no leaking today.  Pt does feel FM.  Pt has not spoken with RD in a while, wanted to speak to her.  This RN did explain labs completed with negative results of SROM but informed will send message to RD due to request.  Informed RD seeing pt's and may not be able to call right away.  Pt aware and agrees.

## 2024-04-11 NOTE — TRIAGE
[facility]  part of [facility]      Triage Note    Ifrah Perea Patient Status:  Outpatient    1995 MRN [id number]   Location [facility] Attending Zechariah De Los Santos MD   Hosp Day # 0 PCP Becky Marc MD      Para:   Estimated Date of Delivery: 5/15/24  Gestation: 35w0d    Chief Complaint    R/o Rom         Allergies:    Allergies   Allergen Reactions    Sulfa Antibiotics HIVES       Orders Placed This Encounter   Procedures    POCT Ferning       Lab Results   Component Value Date    WBC 8.6 2024    HGB 12.4 2024    HCT 36.5 2024    .0 2024    CREATSERUM 0.71 2023    BUN 12 2023     2023    K 3.8 2023     2023    CO2 24.0 2023    GLU 74 2023    CA 9.9 2023    ALB 4.6 2023    ALKPHO 56 2023    BILT 1.0 2023    TP 7.8 2023    AST 20 2023    ALT 36 2023    T4F 1.5 2024    TSH 1.430 2024       Clinitek UA  Lab Results   Component Value Date    URINECUL No Growth at 18-24 hrs. 10/26/2023       UA  No results found for: \"COLORUR\", \"CLARITY\", \"SPECGRAVITY\", \"PROUR\", \"GLUUR\", \"KETUR\", \"BILUR\", \"BLOODURINE\", \"NITRITE\", \"UROBILINOGEN\", \"LEUUR\", \"UASA\"    Vitals:    04/10/24 2210   BP: 119/81   Pulse: 90   Temp: 98.1 °F (36.7 °C)   TempSrc: Oral       NST  Variability: Moderate           Accelerations: Yes           Decelerations: None            Baseline: 135 BPM           Uterine Irritability: No           Contractions: Irregular                                        Acoustic Stimulator: No           Nonstress Test Interpretation: Reactive           Nonstress Test Second Interpretation: Reactive          FHR Category: Category I             Additional Comments   Pt came in for R/O ROM. Per pt, she felt leaking of fluid this morning around 0930 while working out then felt it again around 2130. Pt has not felt any  additional leaking since. Pt placed on monitor with reactive NST. Speculum done, no pooling detected. Amniotest negative and ferning negative .Magali OTERO notified. Discharge order received. Pt given written and verbal instructions. All questions answered by RN.     Chief Complaint   Patient presents with    R/o Rom     Pt reports feeling a little leaking of fluid at 0930 while working out. Per pt it was not a lot and she did not think anything of it. Pt felt another gush of more fluid around 2130. Reports + FM and denies feeling contractions.          Annmarie LEVY RN  4/10/2024 11:00 PM

## 2024-04-11 NOTE — TELEPHONE ENCOUNTER
Received a call from patient requesting to speak with  to go over her visit from yesterday at [facility] .  Also patient schedule for a OB appointment for tomorrow with  .

## 2024-04-11 NOTE — TELEPHONE ENCOUNTER
Telephone call:     Patient paged due to LOF. Physical therapy noted LOF started at 2100. Noted it was more than usual. Noted she is unsure of the color due to wearing black pants and dark underwear. Noted possible LOF this morning as a small amount at about 0930. Denies any contractions, or vaginal bleeding.  Noted good fetal movement.     Recommend to come to OB triage. OB triage aware of pending.     Dr. Zechariah De Los Santos MD    [facility]     This note was created by Dragon voice recognition. Errors in content may be related to improper recognition by the system; efforts to review and correct have been done but errors may still exist. Please be advised the primary purpose of this note is for me to communicate medical care. Standard sentence structure is not always used. Medical terminology and medical abbreviations may be used. There may be grammatical, typographical, and automated fill ins that may have errors missed in proofreading.

## 2024-04-11 NOTE — PROGRESS NOTES
Pt is a 29 year old female admitted to TR1/TR1-A.     Chief Complaint   Patient presents with    R/o Rom     Pt reports feeling a little leaking of fluid at 0930 while working out. Per pt it was not a lot and she did not think anything of it. Pt felt another gush of more fluid around 2130. Reports + FM and denies feeling contractions.       Pt is  35w0d intra-uterine pregnancy.  History obtained, consents signed. Oriented to room, staff, and plan of care.

## 2024-04-12 ENCOUNTER — LAB ENCOUNTER (OUTPATIENT)
Dept: LAB | Facility: REFERENCE LAB | Age: 29
End: 2024-04-12
Attending: OBSTETRICS & GYNECOLOGY
Payer: COMMERCIAL

## 2024-04-12 ENCOUNTER — ROUTINE PRENATAL (OUTPATIENT)
Dept: OBGYN CLINIC | Facility: CLINIC | Age: 29
End: 2024-04-12
Payer: COMMERCIAL

## 2024-04-12 VITALS
HEIGHT: 64 IN | WEIGHT: 141.63 LBS | BODY MASS INDEX: 24.18 KG/M2 | SYSTOLIC BLOOD PRESSURE: 110 MMHG | DIASTOLIC BLOOD PRESSURE: 68 MMHG

## 2024-04-12 DIAGNOSIS — Z34.00 SUPERVISION OF NORMAL FIRST PREGNANCY, ANTEPARTUM (HCC): Primary | ICD-10-CM

## 2024-04-12 DIAGNOSIS — Z34.00 SUPERVISION OF NORMAL FIRST PREGNANCY, ANTEPARTUM (HCC): ICD-10-CM

## 2024-04-12 LAB — T PALLIDUM AB SER QL IA: NONREACTIVE

## 2024-04-12 PROCEDURE — 87389 HIV-1 AG W/HIV-1&-2 AB AG IA: CPT

## 2024-04-12 PROCEDURE — 36415 COLL VENOUS BLD VENIPUNCTURE: CPT

## 2024-04-12 PROCEDURE — 86780 TREPONEMA PALLIDUM: CPT

## 2024-04-12 NOTE — PROGRESS NOTES
29 year old  at 35w2d     Was seen in OB triage for LOF - no ROM.    Contractions: No  VB: No  LOF: No  Fetal movement: yes      Return OB  Pre-Radha Care: up to date. HIV, trep orders placed. .   GBS next visit.    Patient Active Problem List   Diagnosis    Hypothyroidism due to Hashimoto's thyroiditis    Multinodular thyroid    Carrier of nephrotic syndrome - partner negative    Negative prequel  pregnancy       - Return to clinic in: 1 weeks    Tina Mars MD

## 2024-04-16 PROBLEM — N89.8 VAGINAL DISCHARGE: Status: ACTIVE | Noted: 2024-04-16

## 2024-04-20 ENCOUNTER — ROUTINE PRENATAL (OUTPATIENT)
Dept: OBGYN CLINIC | Facility: CLINIC | Age: 29
End: 2024-04-20
Payer: COMMERCIAL

## 2024-04-20 VITALS
BODY MASS INDEX: 24.24 KG/M2 | DIASTOLIC BLOOD PRESSURE: 66 MMHG | WEIGHT: 142 LBS | SYSTOLIC BLOOD PRESSURE: 96 MMHG | HEIGHT: 64 IN

## 2024-04-20 DIAGNOSIS — Z34.00 SUPERVISION OF NORMAL FIRST PREGNANCY, ANTEPARTUM (HCC): Primary | ICD-10-CM

## 2024-04-20 PROCEDURE — 87081 CULTURE SCREEN ONLY: CPT | Performed by: OBSTETRICS & GYNECOLOGY

## 2024-04-20 PROCEDURE — 87150 DNA/RNA AMPLIFIED PROBE: CPT | Performed by: OBSTETRICS & GYNECOLOGY

## 2024-04-20 NOTE — PROGRESS NOTES
29 year old  at 36w3d     Was seen in OB triage for LOF - no ROM.    Contractions: No  VB: No  LOF: No  Fetal movement: yes      Return OB  Pre- Care: up to date.   - GBS collected today.    Patient Active Problem List   Diagnosis    Hypothyroidism due to Hashimoto's thyroiditis    Multinodular thyroid    Carrier of nephrotic syndrome - partner negative    Negative prequel  pregnancy    Vaginal discharge       - Return to clinic in: 1 weeks    Luisa Mcintyre DO

## 2024-04-24 ENCOUNTER — ROUTINE PRENATAL (OUTPATIENT)
Dept: OBGYN CLINIC | Facility: CLINIC | Age: 29
End: 2024-04-24
Payer: COMMERCIAL

## 2024-04-24 VITALS — WEIGHT: 143 LBS | BODY MASS INDEX: 25 KG/M2 | DIASTOLIC BLOOD PRESSURE: 76 MMHG | SYSTOLIC BLOOD PRESSURE: 112 MMHG

## 2024-04-24 DIAGNOSIS — Z36.9 UNSPECIFIED ANTENATAL SCREENING (HCC): Primary | ICD-10-CM

## 2024-04-24 LAB — GROUP B STREP BY PCR FOR PCR OVT: NEGATIVE

## 2024-04-24 NOTE — PROGRESS NOTES
Doing well. No OB complaints. +FM.   Bedside ultrasound cephalic. Grossly normal appearing fluid.    Reviewed labor precautions.   ELANA 1 weeks.     Dr. Zechariah De Los Santos MD    [facility]     This note was created by Dragon voice recognition. Errors in content may be related to improper recognition by the system; efforts to review and correct have been done but errors may still exist. Please be advised the primary purpose of this note is for me to communicate medical care. Standard sentence structure is not always used. Medical terminology and medical abbreviations may be used. There may be grammatical, typographical, and automated fill ins that may have errors missed in proofreading.

## 2024-05-01 ENCOUNTER — ROUTINE PRENATAL (OUTPATIENT)
Dept: OBGYN CLINIC | Facility: CLINIC | Age: 29
End: 2024-05-01
Payer: COMMERCIAL

## 2024-05-01 VITALS
BODY MASS INDEX: 24.69 KG/M2 | HEIGHT: 64 IN | DIASTOLIC BLOOD PRESSURE: 68 MMHG | SYSTOLIC BLOOD PRESSURE: 112 MMHG | WEIGHT: 144.63 LBS

## 2024-05-01 DIAGNOSIS — Z34.00 SUPERVISION OF NORMAL FIRST PREGNANCY, ANTEPARTUM (HCC): Primary | ICD-10-CM

## 2024-05-01 NOTE — PROGRESS NOTES
29 year old  at 38w0d      Some fredo lozoya    Contractions: No  VB: No  LOF: No  Fetal movement: yes    Mild headache, no vision changes, epigastric pain. Reviewed warning signs for preeclampsia     Return OB  Pre- Care: up to date.     Patient Active Problem List   Diagnosis    Hypothyroidism due to Hashimoto's thyroiditis    Multinodular thyroid    Carrier of nephrotic syndrome - partner negative    Negative prequel  pregnancy    Vaginal discharge       - Return to clinic in: 1 weeks    Tina Mars MD

## 2024-05-02 ENCOUNTER — TELEPHONE (OUTPATIENT)
Dept: OBGYN CLINIC | Facility: CLINIC | Age: 29
End: 2024-05-02

## 2024-05-02 ENCOUNTER — PATIENT MESSAGE (OUTPATIENT)
Dept: OBGYN CLINIC | Facility: CLINIC | Age: 29
End: 2024-05-02

## 2024-05-02 NOTE — TELEPHONE ENCOUNTER
Patient states her due date is 5/15 but her insurance told her she needs an authorization form filled out for delivery. Will need to [place] it urgent.    Prior Auth number  [id number]    Would like to know when it is completed.

## 2024-05-02 NOTE — TELEPHONE ENCOUNTER
This encounter was discuss with Rani and therefore closed.      From: Ifrah Perea  To: Luisa Mcintyre  Sent: 5/2/2024  4:02 PM CDT  Subject: Prior Authorization for Delivery     Hello Dr. Mcintyre,    I spoke with [facility] today and they said that a prior authorization is needed for my upcoming labor and delivery, otherwise the claim could be denied. If you could please complete this ASAP that would be greatly appreciated. The phone number to call is [phone]. They said to mention that it is URGENT so that the insurance can process this in 24-48 hours since my due date is 5/15. I apologize for the late notice on this as I wasn’t aware this was something that I had to do. Thank you in advance!     -Ifrah Perea

## 2024-05-03 NOTE — TELEPHONE ENCOUNTER
Patient is calling requesting update on message that were sent via "[employer] and also stated she has more questions. Please follow up with patient.

## 2024-05-03 NOTE — TELEPHONE ENCOUNTER
Called [facility] for authorization.  Per adviser pt only needs authorization if exceeds 48 hours after  or 96 hours after c/section.  Ref# [id number]    Called pt informed of above, provided reference # and agrees.

## 2024-05-09 ENCOUNTER — ROUTINE PRENATAL (OUTPATIENT)
Dept: OBGYN CLINIC | Facility: CLINIC | Age: 29
End: 2024-05-09
Payer: COMMERCIAL

## 2024-05-09 VITALS
HEIGHT: 64 IN | SYSTOLIC BLOOD PRESSURE: 108 MMHG | WEIGHT: 146 LBS | DIASTOLIC BLOOD PRESSURE: 64 MMHG | BODY MASS INDEX: 24.92 KG/M2

## 2024-05-09 DIAGNOSIS — Z34.00 SUPERVISION OF NORMAL FIRST PREGNANCY, ANTEPARTUM (HCC): Primary | ICD-10-CM

## 2024-05-09 NOTE — PROGRESS NOTES
29 year old  at 39w1d      Some fredo lozoya    Contractions: No  VB: No  LOF: No  Fetal movement: yes    Cervix: 3/70/-3, soft, posterior    Return OB  Pre-Radha Care: up to date.     Patient Active Problem List   Diagnosis    Hypothyroidism due to Hashimoto's thyroiditis    Multinodular thyroid    Carrier of nephrotic syndrome - partner negative    Negative prequel  pregnancy    Vaginal discharge       - Return to clinic in: 1 weeks    Luisa Mcintyre DO

## 2024-05-10 ENCOUNTER — TELEPHONE (OUTPATIENT)
Dept: OBGYN CLINIC | Facility: CLINIC | Age: 29
End: 2024-05-10

## 2024-05-10 NOTE — TELEPHONE ENCOUNTER
RN spoke to pt. At her appt yesterday with Dr. Mcintyre, pt discussed a possible induction if she hasn't delivered by her 5/15 appt (at 40 weeks). Pt is wondering if she would be able to get an appt for an induction later that week if she decides to go that route. RN told pt that RN would call [facility] and call her back. Pt verbalized understanding and agreed with plan of care.

## 2024-05-10 NOTE — TELEPHONE ENCOUNTER
RN spoke with pt and told her that there are multiple openings for inductions on Thursday and Friday, per [facility]. Pt is wondering whether she should schedule her induction now so that she has a spot saved. RN recommended that since pt is already at 3/70/-3, she wait until Monday, and then call this RN. RN told pt that RN will discuss induction options with Dr. Mcintyre at that time. RN briefly explained the induction and labor process. Pt verbalized understanding and agreed with plan of care.

## 2024-05-10 NOTE — TELEPHONE ENCOUNTER
The patient called and stated that she has an upcoming appointment on 5/15/24. She was curious about the induction process. She wants to know about how to schedule and when.

## 2024-05-13 ENCOUNTER — TELEPHONE (OUTPATIENT)
Dept: OBGYN CLINIC | Facility: CLINIC | Age: 29
End: 2024-05-13

## 2024-05-13 NOTE — TELEPHONE ENCOUNTER
Patient is returning call requesting to speak to RN, per patient is calling to discuss induction options . Please follow up with patient.

## 2024-05-13 NOTE — TELEPHONE ENCOUNTER
RN spoke with pt. Pt is trying to weigh the pros and cons of being induced tomorrow versus waiting until after her due date (5/15/24). Pt is becoming concerned about possible preeclampsia symptoms--pt says that she has has a dull headache on and off for a few weeks. Her BP readings in the office have been good, but the diastolic reading have been slightly elevated at home (high 80s and 90s). Pt also reports seeing floaters in her vision. Pt denies epigastric pain or swelling. Pt doesn't currently have a headache. She says that she had Christiano[place]Boone contractions last night, but nothing consistent or intense. Pt is wondering if there is an advantage to being induced tomorrow versus Thursday, and she is wondering if Dr. Mcintyre has any thoughts on the topic. RN told pt that RN will speak with Dr. Mcintyre and will call her back. Pt verbalized understanding and agreed with plan of care.

## 2024-05-13 NOTE — TELEPHONE ENCOUNTER
RN spoke with pt. RN told pt that Dr. Mcintyre isn't worried about preeclampsia at this time. RN told pt that there is no advantage or disadvantage in being induced tomorrow versus Thursday. Pt is going to think it over and call back with her decision. RN told pt to call back if she has any questions. Pt verbalized understanding and agreed with plan of care.

## 2024-05-13 NOTE — TELEPHONE ENCOUNTER
RN spoke with pt. Pt would like to be induced tomorrow morning. RN told pt that RN would call and schedule the induction and then send a confirmation message via [employer]. Pt verbalized understanding and agreed with plan of care.      RN called [facility] to schedule induction for 5/14 at 5am. RN informed Tom Shelton and Manuel Saravia to send confirmation message to pt.

## 2024-05-13 NOTE — TELEPHONE ENCOUNTER
RN spoke to pt and told her that Dr. Mcintyre is fine with her being induced. RN told pt that Dr. Mcintyre is on call tomorrow, and there are openings for an induction tomorrow. Pt is going to speak to her partner and will call back. Pt verbalized understanding and agreed with plan of care.

## 2024-05-14 ENCOUNTER — APPOINTMENT (OUTPATIENT)
Dept: OBGYN CLINIC | Facility: HOSPITAL | Age: 29
End: 2024-05-14
Attending: OBSTETRICS & GYNECOLOGY

## 2024-05-14 ENCOUNTER — HOSPITAL ENCOUNTER (INPATIENT)
Facility: HOSPITAL | Age: 29
LOS: 2 days | Discharge: HOME | End: 2024-05-16
Attending: OBSTETRICS & GYNECOLOGY | Admitting: OBSTETRICS & GYNECOLOGY

## 2024-05-14 ENCOUNTER — ANESTHESIA (OUTPATIENT)
Dept: OBGYN UNIT | Facility: HOSPITAL | Age: 29
End: 2024-05-14

## 2024-05-14 ENCOUNTER — ANESTHESIA EVENT (OUTPATIENT)
Dept: OBGYN UNIT | Facility: HOSPITAL | Age: 29
End: 2024-05-14

## 2024-05-14 PROBLEM — Z34.90 PREGNANCY (HCC): Status: ACTIVE | Noted: 2024-05-14

## 2024-05-14 PROBLEM — Z34.90 ENCOUNTER FOR INDUCTION OF LABOR (HCC): Status: ACTIVE | Noted: 2024-05-14

## 2024-05-14 LAB
ANTIBODY SCREEN: NEGATIVE
BASOPHILS # BLD AUTO: 0.04 X10(3) UL (ref 0–0.2)
BASOPHILS NFR BLD AUTO: 0.5 %
DEPRECATED RDW RBC AUTO: 43.3 FL (ref 35.1–46.3)
EOSINOPHIL # BLD AUTO: 0.09 X10(3) UL (ref 0–0.7)
EOSINOPHIL NFR BLD AUTO: 1 %
ERYTHROCYTE [DISTWIDTH] IN BLOOD BY AUTOMATED COUNT: 13.5 % (ref 11–15)
HCT VFR BLD AUTO: 39.4 %
HGB BLD-MCNC: 13.5 G/DL
IMM GRANULOCYTES # BLD AUTO: 0.04 X10(3) UL (ref 0–1)
IMM GRANULOCYTES NFR BLD: 0.5 %
LYMPHOCYTES # BLD AUTO: 2.55 X10(3) UL (ref 1–4)
LYMPHOCYTES NFR BLD AUTO: 29.2 %
MCH RBC QN AUTO: 30.2 PG (ref 26–34)
MCHC RBC AUTO-ENTMCNC: 34.3 G/DL (ref 31–37)
MCV RBC AUTO: 88.1 FL
MONOCYTES # BLD AUTO: 0.52 X10(3) UL (ref 0.1–1)
MONOCYTES NFR BLD AUTO: 6 %
NEUTROPHILS # BLD AUTO: 5.49 X10 (3) UL (ref 1.5–7.7)
NEUTROPHILS # BLD AUTO: 5.49 X10(3) UL (ref 1.5–7.7)
NEUTROPHILS NFR BLD AUTO: 62.8 %
PLATELET # BLD AUTO: 153 10(3)UL (ref 150–450)
RBC # BLD AUTO: 4.47 X10(6)UL
RH BLOOD TYPE: POSITIVE
WBC # BLD AUTO: 8.7 X10(3) UL (ref 4–11)

## 2024-05-14 PROCEDURE — 3E033VJ INTRODUCTION OF OTHER HORMONE INTO PERIPHERAL VEIN, PERCUTANEOUS APPROACH: ICD-10-PCS | Performed by: OBSTETRICS & GYNECOLOGY

## 2024-05-14 PROCEDURE — 59410 OBSTETRICAL CARE: CPT | Performed by: OBSTETRICS & GYNECOLOGY

## 2024-05-14 PROCEDURE — 0HQ9XZZ REPAIR PERINEUM SKIN, EXTERNAL APPROACH: ICD-10-PCS | Performed by: OBSTETRICS & GYNECOLOGY

## 2024-05-14 PROCEDURE — 10907ZC DRAINAGE OF AMNIOTIC FLUID, THERAPEUTIC FROM PRODUCTS OF CONCEPTION, VIA NATURAL OR ARTIFICIAL OPENING: ICD-10-PCS | Performed by: OBSTETRICS & GYNECOLOGY

## 2024-05-14 RX ORDER — BUPIVACAINE HCL/0.9 % NACL/PF 0.25 %
5 PLASTIC BAG, INJECTION (ML) EPIDURAL AS NEEDED
Status: DISCONTINUED | OUTPATIENT
Start: 2024-05-14 | End: 2024-05-16

## 2024-05-14 RX ORDER — BENZOCAINE/MENTHOL 6 MG-10 MG
1 LOZENGE MUCOUS MEMBRANE EVERY 6 HOURS PRN
Status: DISCONTINUED | OUTPATIENT
Start: 2024-05-14 | End: 2024-05-16

## 2024-05-14 RX ORDER — NALBUPHINE HYDROCHLORIDE 10 MG/ML
2.5 INJECTION, SOLUTION INTRAMUSCULAR; INTRAVENOUS; SUBCUTANEOUS
Status: DISCONTINUED | OUTPATIENT
Start: 2024-05-14 | End: 2024-05-16

## 2024-05-14 RX ORDER — DOCUSATE SODIUM 100 MG/1
100 CAPSULE, LIQUID FILLED ORAL
Status: DISCONTINUED | OUTPATIENT
Start: 2024-05-14 | End: 2024-05-14

## 2024-05-14 RX ORDER — LIDOCAINE HYDROCHLORIDE 10 MG/ML
30 INJECTION, SOLUTION EPIDURAL; INFILTRATION; INTRACAUDAL; PERINEURAL ONCE
Status: DISCONTINUED | OUTPATIENT
Start: 2024-05-14 | End: 2024-05-14 | Stop reason: HOSPADM

## 2024-05-14 RX ORDER — BUPIVACAINE HYDROCHLORIDE 2.5 MG/ML
20 INJECTION, SOLUTION EPIDURAL; INFILTRATION; INTRACAUDAL ONCE
Status: DISCONTINUED | OUTPATIENT
Start: 2024-05-14 | End: 2024-05-14 | Stop reason: HOSPADM

## 2024-05-14 RX ORDER — AMMONIA INHALANTS 0.04 G/.3ML
0.3 INHALANT RESPIRATORY (INHALATION) AS NEEDED
Status: DISCONTINUED | OUTPATIENT
Start: 2024-05-14 | End: 2024-05-16

## 2024-05-14 RX ORDER — SIMETHICONE 80 MG
80 TABLET,CHEWABLE ORAL 3 TIMES DAILY PRN
Status: DISCONTINUED | OUTPATIENT
Start: 2024-05-14 | End: 2024-05-16

## 2024-05-14 RX ORDER — ONDANSETRON 2 MG/ML
4 INJECTION INTRAMUSCULAR; INTRAVENOUS EVERY 6 HOURS PRN
Status: DISCONTINUED | OUTPATIENT
Start: 2024-05-14 | End: 2024-05-14 | Stop reason: HOSPADM

## 2024-05-14 RX ORDER — IBUPROFEN 600 MG/1
600 TABLET ORAL EVERY 6 HOURS
Status: DISCONTINUED | OUTPATIENT
Start: 2024-05-14 | End: 2024-05-14

## 2024-05-14 RX ORDER — ACETAMINOPHEN 500 MG
1000 TABLET ORAL EVERY 6 HOURS PRN
Status: DISCONTINUED | OUTPATIENT
Start: 2024-05-14 | End: 2024-05-14 | Stop reason: HOSPADM

## 2024-05-14 RX ORDER — CITRIC ACID/SODIUM CITRATE 334-500MG
30 SOLUTION, ORAL ORAL AS NEEDED
Status: DISCONTINUED | OUTPATIENT
Start: 2024-05-14 | End: 2024-05-14 | Stop reason: HOSPADM

## 2024-05-14 RX ORDER — BISACODYL 10 MG
10 SUPPOSITORY, RECTAL RECTAL ONCE AS NEEDED
Status: DISCONTINUED | OUTPATIENT
Start: 2024-05-14 | End: 2024-05-16

## 2024-05-14 RX ORDER — DOCUSATE SODIUM 100 MG/1
100 CAPSULE, LIQUID FILLED ORAL 2 TIMES DAILY
Status: DISCONTINUED | OUTPATIENT
Start: 2024-05-15 | End: 2024-05-16

## 2024-05-14 RX ORDER — TRANEXAMIC ACID 10 MG/ML
INJECTION, SOLUTION INTRAVENOUS
Status: COMPLETED
Start: 2024-05-14 | End: 2024-05-14

## 2024-05-14 RX ORDER — BUPIVACAINE HYDROCHLORIDE 2.5 MG/ML
INJECTION, SOLUTION EPIDURAL; INFILTRATION; INTRACAUDAL
Status: COMPLETED | OUTPATIENT
Start: 2024-05-14 | End: 2024-05-14

## 2024-05-14 RX ORDER — ACETAMINOPHEN 500 MG
500 TABLET ORAL EVERY 6 HOURS PRN
Status: DISCONTINUED | OUTPATIENT
Start: 2024-05-14 | End: 2024-05-14 | Stop reason: HOSPADM

## 2024-05-14 RX ORDER — LIDOCAINE HYDROCHLORIDE 10 MG/ML
INJECTION, SOLUTION INFILTRATION; PERINEURAL
Status: COMPLETED | OUTPATIENT
Start: 2024-05-14 | End: 2024-05-14

## 2024-05-14 RX ORDER — CALCIUM CARBONATE 500 MG/1
1000 TABLET, CHEWABLE ORAL EVERY 4 HOURS PRN
Status: DISCONTINUED | OUTPATIENT
Start: 2024-05-14 | End: 2024-05-14 | Stop reason: HOSPADM

## 2024-05-14 RX ORDER — IBUPROFEN 600 MG/1
600 TABLET ORAL ONCE AS NEEDED
Status: DISCONTINUED | OUTPATIENT
Start: 2024-05-14 | End: 2024-05-14 | Stop reason: HOSPADM

## 2024-05-14 RX ORDER — ACETAMINOPHEN 500 MG
500 TABLET ORAL EVERY 6 HOURS PRN
Status: DISCONTINUED | OUTPATIENT
Start: 2024-05-14 | End: 2024-05-16

## 2024-05-14 RX ORDER — TERBUTALINE SULFATE 1 MG/ML
0.25 INJECTION, SOLUTION SUBCUTANEOUS AS NEEDED
Status: COMPLETED | OUTPATIENT
Start: 2024-05-14 | End: 2024-05-14

## 2024-05-14 RX ORDER — ONDANSETRON 2 MG/ML
4 INJECTION INTRAMUSCULAR; INTRAVENOUS EVERY 6 HOURS PRN
Status: DISCONTINUED | OUTPATIENT
Start: 2024-05-14 | End: 2024-05-16

## 2024-05-14 RX ORDER — LIDOCAINE HYDROCHLORIDE AND EPINEPHRINE 15; 5 MG/ML; UG/ML
INJECTION, SOLUTION EPIDURAL
Status: COMPLETED | OUTPATIENT
Start: 2024-05-14 | End: 2024-05-14

## 2024-05-14 RX ORDER — SODIUM CHLORIDE, SODIUM LACTATE, POTASSIUM CHLORIDE, CALCIUM CHLORIDE 600; 310; 30; 20 MG/100ML; MG/100ML; MG/100ML; MG/100ML
INJECTION, SOLUTION INTRAVENOUS CONTINUOUS
Status: DISCONTINUED | OUTPATIENT
Start: 2024-05-14 | End: 2024-05-14 | Stop reason: HOSPADM

## 2024-05-14 RX ORDER — DEXTROSE, SODIUM CHLORIDE, SODIUM LACTATE, POTASSIUM CHLORIDE, AND CALCIUM CHLORIDE 5; .6; .31; .03; .02 G/100ML; G/100ML; G/100ML; G/100ML; G/100ML
INJECTION, SOLUTION INTRAVENOUS AS NEEDED
Status: DISCONTINUED | OUTPATIENT
Start: 2024-05-14 | End: 2024-05-14 | Stop reason: HOSPADM

## 2024-05-14 RX ORDER — IBUPROFEN 600 MG/1
600 TABLET ORAL EVERY 6 HOURS PRN
Status: DISCONTINUED | OUTPATIENT
Start: 2024-05-14 | End: 2024-05-16

## 2024-05-14 RX ORDER — ACETAMINOPHEN 500 MG
1000 TABLET ORAL EVERY 6 HOURS PRN
Status: DISCONTINUED | OUTPATIENT
Start: 2024-05-14 | End: 2024-05-16

## 2024-05-14 RX ADMIN — LIDOCAINE HYDROCHLORIDE 5 ML: 10 INJECTION, SOLUTION INFILTRATION; PERINEURAL at 11:20:00

## 2024-05-14 RX ADMIN — LIDOCAINE HYDROCHLORIDE AND EPINEPHRINE 5 ML: 15; 5 INJECTION, SOLUTION EPIDURAL at 11:20:00

## 2024-05-14 RX ADMIN — BUPIVACAINE HYDROCHLORIDE 5 ML: 2.5 INJECTION, SOLUTION EPIDURAL; INFILTRATION; INTRACAUDAL at 11:20:00

## 2024-05-14 NOTE — PLAN OF CARE
Problem: Patient Centered Care  Goal: Patient preferences are identified and integrated in the patient's plan of care  Description: Interventions:  - What would you like us to know as we care for you? We do not know the gender of our baby  - Provide timely, complete, and accurate information to patient/family  - Incorporate patient and family knowledge, values, beliefs, and cultural backgrounds into the planning and delivery of care  - Encourage patient/family to participate in care and decision-making at the level they choose  - Honor patient and family perspectives and choices  5/14/2024 0815 by Ciara Luevano RN  Outcome: Progressing  5/14/2024 0815 by Ciara Luevano RN  Outcome: Progressing     Problem: Patient/Family Goals  Goal: Patient/Family Long Term Goal  Description: Patient's Long Term Goal: Patient's Long Term Goal: Uncomplicated Delivery     Interventions:  - Assessment/Monitoring  - Induction/Augmentation per protocol and Provider order  - C/S per protocol and Provider order   - Education  - Intervention per protocol and Provider order with education   - Involve patient in POC  - See additional Care Plan goals for specific interventions  - See additional Care Plan goals for specific interventions  5/14/2024 0815 by Ciara Luevano RN  Outcome: Progressing  5/14/2024 0815 by Ciara Luevano RN  Outcome: Progressing  Goal: Patient/Family Short Term Goal  Description: Patient's Short Term Goal:Patient's Short Term Goal: Comfort and Pain Control     Interventions:   - Non Pharmacological pain intervention   - IV/IM and Epidural pain medication per Provider order and patient request  - Education  - Involve Patient in POC   - See additional Care Plan goals for specific interventions  - See additional Care Plan goals for specific interventions  5/14/2024 0815 by Ciara Luevano RN  Outcome: Progressing  5/14/2024 0815 by Ciara Luevano RN  Outcome: Progressing     Problem: BIRTH  - VAGINAL/ SECTION  Goal: Fetal and maternal status remain reassuring during the birth process  Description: INTERVENTIONS:  - Monitor vital signs  - Monitor fetal heart rate  - Monitor uterine activity  - Monitor labor progression (vaginal delivery)  - DVT prophylaxis (C/S delivery)  - Surgical antibiotic prophylaxis (C/S delivery)  Outcome: Progressing     Problem: PAIN - ADULT  Goal: Verbalizes/displays adequate comfort level or patient's stated pain goal  Description: INTERVENTIONS:  - Encourage pt to monitor pain and request assistance  - Assess pain using appropriate pain scale  - Administer analgesics based on type and severity of pain and evaluate response  - Implement non-pharmacological measures as appropriate and evaluate response  - Consider cultural and social influences on pain and pain management  - Manage/alleviate anxiety  - Utilize distraction and/or relaxation techniques  - Monitor for opioid side effects  - Notify MD/LIP if interventions unsuccessful or patient reports new pain  - Anticipate increased pain with activity and pre-medicate as appropriate  Outcome: Progressing     Problem: ANXIETY  Goal: Will report anxiety at manageable levels  Description: INTERVENTIONS:  - Administer medication as ordered  - Teach and rehearse alternative coping skills  - Provide emotional support with 1:1 interaction with staff  Outcome: Progressing

## 2024-05-14 NOTE — H&P
[facility]  Obstetrics and Gynecology  History & Physical    Ifrah Perea Patient Status:  Inpatient    1995 MRN [id number]   Location [facility] Attending Geraldine Edwards*   Hospital Day 0 PCP Becky Marc MD     CC: Patient is here for induction of labor    SUBJECTIVE:    Ifrah Perea is a 29 year old  female at 39w6d Estimated Date of Delivery: 5/15/24 who is being admitted for induction of labor. Her current obstetrical history is hypothyroidism. Patient reports no complaints.  Is feeling mild intensity contractions.    negative VB.   negative LOF.    positive Fetal movement.   negative Nausea, Vomiting, and RUQ/Epigastric pain.   Has had a HA and some blurry vision on and off for the past week. Not intense enough to need pain medication      JONAS Confirmation  LMP: Patient's last menstrual period was 2023 (exact date).  JONAS: 5/15/2024, by Ultrasound         Obstetric History:   OB History    Para Term  AB Living   1 0 0 0 0 0   SAB IAB Ectopic Multiple Live Births   0 0 0 0 0      # Outcome Date GA Lbr Marco Antonio/2nd Weight Sex Type Anes PTL Lv   1 Current              Past Medical History:   Past Medical History:    Acne    Hypothyroidism     Past Social History:   Past Surgical History:   Procedure Laterality Date    Patient denies any surgical history       Family History:   Family History   Problem Relation Age of Onset    Thyroid Disorder Father     Hypertension Father     Thyroid Disorder Mother     Osteoporosis Maternal Grandmother     Obesity Maternal Grandfather     No Known Problems Paternal Grandmother     Cancer Paternal Grandfather     Other (lymphoma) Paternal Grandfather     No Known Problems Sister      Social History:   Social History     Tobacco Use    Smoking status: Never     Passive exposure: Never    Smokeless tobacco: Never   Substance Use Topics    Alcohol use: Not Currently     Comment:  socially on weekends       Home Meds:   Medications Prior to Admission   Medication Sig Dispense Refill Last Dose    Calcium Carbonate 600 MG Oral Tab Take 1 tablet (600 mg total) by mouth.   2024    levothyroxine 88 MCG Oral Tab Take 1 tablet (88 mcg total) by mouth before breakfast. 84 tablet 3 2024    Prenatal MV-Min-Fe Fum-FA-DHA (PRENATAL 1 OR) Take by mouth.   2024    Cholecalciferol (VITAMIN D) 50 MCG (2000 UT) Oral Cap Take by mouth.   2024     Allergies:   Allergies   Allergen Reactions    Sulfa Antibiotics HIVES       OBJECTIVE:    Temp:  [98.2 °F (36.8 °C)-98.5 °F (36.9 °C)] 98.5 °F (36.9 °C)  Pulse:  [89-96] 89  Resp:  [16] 16  BP: (113-122)/(81-88) 113/81  Body mass index is 25.06 kg/m².    General: AAO. NAD.   Lungs: Respirations non labored   CV: peripheral pulses +2 bilaterally   Abdomen: FHT present, gravid   Extremities: negative edema bilaterally, negative calf tenderness bilaterally    FHT: moderate variability/125 BPM / Positive accelerations/Negative decelerations   TOCO: irregular q 4-8 minutes    SVE: 3 / 70 / -3 per RN on admission     [place]:  cephalic    Prenatal Labs Brief Review   Blood Type:   Lab Results   Component Value Date    ABO O 2024    RH Positive 2024     GBS:  Negative      Inpatient labs:  Lab Results   Component Value Date    WBC 8.7 2024    HGB 13.5 2024    HCT 39.4 2024    .0 2024       ASSESSMENT/ PLAN:    Ifrah Perea is a 29 year old  female at 39w6d Estimated Date of Delivery: 5/15/24 who is being admitted for elective induction of labor.    Patient Active Problem List   Diagnosis    Hypothyroidism due to Hashimoto's thyroiditis    Multinodular thyroid    Carrier of nephrotic syndrome - partner negative    Negative prequel  pregnancy    Vaginal discharge    Pregnancy (HCC)       1. Induction of labor:   - admitted with routine labs  - cont pitocin per protocol  - plan for another cervix exam  in approx 6 hours, consider amniotomy  - HA minor, BP normotensive, plts normal on admission.  2. Fetal monitoring: CEFM, category 1 currently  3. GBS: negative  4. Pain: epidural when requested    Risks, benefits, alternatives and possible complications have been discussed in detail with the patient.  Pre-admission, admission, and post admission procedures and expectations were discussed in detail.  All questions answered, all appropriate consents signed at the Hospital. Admission is planned for today.     DO VIPUL Chatterjee

## 2024-05-14 NOTE — PROGRESS NOTES
Labor progress note    Patient feeling more intense contractions, thinks her BOW broke.    Vitals:    05/14/24 0723 05/14/24 0725 05/14/24 0726 05/14/24 0935   BP:  113/81 113/81 120/89   BP Location:       Pulse:  89 89 81   Resp:       Temp: 98.5 °F (36.9 °C)      TempSrc:       Weight:         FHT: Baseline 125, moderate variability, + accelerations, no decelerations  [place]: ctx q 2-3 min  Cervix: 4/90/-2, leaking clear fluid    A/P:  30 yo G1 @ 39w6d, IOL   - continue pitocin per protocol  - SROM around 1100 today, clear fluid  - cervix as above  - category 1 FHT  - epidural when requested    Luisa Mcintyre,

## 2024-05-14 NOTE — PROGRESS NOTES
Labor progress note  Patient evaluated due to prolonged deceleration during a period of uterine tachysystole.    Patient comfortable with epidural.    Vitals:    24 1424 24 1430 24 1500 24 1510   BP: 119/82 119/82 92/61 98/58   BP Location:       Pulse: 67 63 61 94   Resp:       Temp:       TempSrc:       SpO2: 99% 100% 99% 100%   Weight:         FHT: baseline 110s, moderate variability, + accelerations, prolonged deceleration x 5 minutes to 60s bpm. Recovery with repositioning, pitocin off and terbuatline given.  Cervix: 7/100/0, FSE placed    A/P:  28 yo G1 @ 39w6d, IOL  - prolonged decel as above, baseline recovered to 100s with moderate variability and accelerations.  - plan to re-start pitocin in 30-45 minutes.  - anticipate     Luisa Mcintyre, DO

## 2024-05-14 NOTE — ANESTHESIA PROCEDURE NOTES
Labor Analgesia    Date/Time: 5/14/2024 11:20 AM    Performed by: Jacinto Johnson MD  Authorized by: Jacinto Johnson MD      General Information and Staff    Start Time:  5/14/2024 11:20 AM  End Time:  5/14/2024 11:30 AM  Anesthesiologist:  Jacinto Johnson MD  Performed by:  Anesthesiologist  Site Identification: surface landmarks    Reason for Block: labor epidural    Preanesthetic Checklist: patient identified, IV checked, risks and benefits discussed, monitors and equipment checked, pre-op evaluation, timeout performed, anesthesia consent and sterile technique used      Procedure Details    Patient Position:  Sitting  Prep: Betadine and patient draped    Monitoring:  Heart rate, cardiac monitor and continuous pulse ox  Approach:  Midline    Epidural Needle    Injection Technique:  MALKA air  Needle Type:  Tuohy  Needle Gauge:  18 G  Needle Length:  3.375 in  Needle Insertion Depth:  6  Location:  L3-4    Spinal Needle      Catheter    Catheter Type:  Side hole  Catheter Size:  20 G  Test Dose:  Negative    Assessment    Sensory Level:  T8    Additional Comments

## 2024-05-14 NOTE — ANESTHESIA PREPROCEDURE EVALUATION
Anesthesia PreOp Note    HPI:     Ifrah Perea is a 29 year old female who presents for preoperative consultation requested by: * No surgeons listed *    Date of Surgery: 5/14/2024    * No procedures listed *  Indication: * No pre-op diagnosis entered *    Relevant Problems   No relevant active problems       NPO:                         History Review:  Patient Active Problem List    Diagnosis Date Noted    Pregnancy (HCC) 05/14/2024    Vaginal discharge 04/16/2024    Negative prequel 2024 pregnancy 11/17/2023    Carrier of nephrotic syndrome - partner negative 11/13/2023    Hypothyroidism due to Hashimoto's thyroiditis 08/31/2023    Multinodular thyroid 08/31/2023       Past Medical History:    Acne    Hypothyroidism       Past Surgical History:   Procedure Laterality Date    Patient denies any surgical history         Medications Prior to Admission   Medication Sig Dispense Refill Last Dose    Calcium Carbonate 600 MG Oral Tab Take 1 tablet (600 mg total) by mouth.   5/13/2024    levothyroxine 88 MCG Oral Tab Take 1 tablet (88 mcg total) by mouth before breakfast. 84 tablet 3 5/14/2024    Prenatal MV-Min-Fe Fum-FA-DHA (PRENATAL 1 OR) Take by mouth.   5/13/2024    Cholecalciferol (VITAMIN D) 50 MCG (2000 UT) Oral Cap Take by mouth.   5/13/2024     Current Facility-Administered Medications Ordered in Epic   Medication Dose Route Frequency Provider Last Rate Last Admin    acetaminophen (Tylenol Extra Strength) tab 500 mg  500 mg Oral Q6H PRN Geraldine Edwards MD        acetaminophen (Tylenol Extra Strength) tab 1,000 mg  1,000 mg Oral Q6H PRN Geraldine Edwards MD        ibuprofen (Motrin) tab 600 mg  600 mg Oral Once PRN Geraldine Edwards MD        ondansetron (Zofran) 4 MG/2ML injection 4 mg  4 mg Intravenous Q6H PRN Geraldine Edwards MD        oxyTOCIN in sodium chloride 0.9% (Pitocin) 30 Units/500mL infusion premix  62.5-900 hill-units/min Intravenous Continuous  Geraldine Edwards MD        terbutaline (Brethine) 1 MG/ML injection 0.25 mg  0.25 mg Subcutaneous PRN Geraldine Edwards MD        sodium citrate-citric acid (Bicitra) 500-334 MG/5ML oral solution 30 mL  30 mL Oral PRN Geraldine Edwards MD        lidocaine PF (Xylocaine-MPF) 1% injection  30 mL Intradermal Once Geraldine Edwards MD        lactated ringers infusion   Intravenous Continuous Geraldine Edwards  mL/hr at 05/14/24 0900 New Bag at 05/14/24 0900    dextrose in lactated ringers 5% infusion   Intravenous PRN Geraldine Edwards MD        lactated ringers IV bolus 500 mL  500 mL Intravenous PRN Geraldine Edwards MD        fentaNYL (Sublimaze) 50 mcg/mL injection 100 mcg  100 mcg Intravenous Once Geraldine Edwards MD        fentaNYL (Sublimaze) 50 mcg/mL injection 50 mcg  50 mcg Intravenous Q30 Min PRN Geraldine Edwards MD        calcium carbonate (Tums) chewable tab 1,000 mg  1,000 mg Oral Q4H PRN Geraldine Edwards MD        oxyTOCIN in sodium chloride 0.9% (Pitocin) 30 Units/500mL infusion premix  0.5-20 hill-units/min Intravenous Continuous Geraldine Edwards MD 6 mL/hr at 05/14/24 1309 6 hill-units/min at 05/14/24 1309    fentaNYL-bupivacaine 2 mcg/mL-0.125% in sodium chloride 0.9% 100 mL EPIDURAL infusion premix   Epidural Continuous Jacinto Johnson MD        fentaNYL (Sublimaze) 50 mcg/mL injection 100 mcg  100 mcg Epidural Once Jacinto Johnson MD        bupivacaine PF (Marcaine) 0.25% injection  20 mL Epidural Once Jacinto Johnson MD        EPHEDrine (PF) 25 MG/5 ML injection 5 mg  5 mg Intravenous PRN Jacinto Johnson MD        nalbuphine (Nubain) 10 mg/mL injection 2.5 mg  2.5 mg Intravenous Q15 Min PRN Jacinto Johnson MD         No current Epic-ordered outpatient medications on file.       Allergies   Allergen Reactions    Sulfa Antibiotics HIVES       Family  History   Problem Relation Age of Onset    Thyroid Disorder Father     Hypertension Father     Thyroid Disorder Mother     Osteoporosis Maternal Grandmother     Obesity Maternal Grandfather     No Known Problems Paternal Grandmother     Cancer Paternal Grandfather     Other (lymphoma) Paternal Grandfather     No Known Problems Sister      Social History     Socioeconomic History    Marital status:    Tobacco Use    Smoking status: Never     Passive exposure: Never    Smokeless tobacco: Never   Vaping Use    Vaping status: Never Used   Substance and Sexual Activity    Alcohol use: Not Currently     Comment: socially on weekends    Drug use: Never    Sexual activity: Yes   Other Topics Concern    Blood Transfusions No    Caffeine Concern No    Stress Concern No    Weight Concern No    Special Diet No    Exercise No    Seat Belt No       Available pre-op labs reviewed.  Lab Results   Component Value Date    WBC 8.7 05/14/2024    RBC 4.47 05/14/2024    HGB 13.5 05/14/2024    HCT 39.4 05/14/2024    MCV 88.1 05/14/2024    MCH 30.2 05/14/2024    MCHC 34.3 05/14/2024    RDW 13.5 05/14/2024    .0 05/14/2024             Vital Signs:  Body mass index is 25.06 kg/m².   weight is 66.2 kg (146 lb). Her oral temperature is 98.8 °F (37.1 °C). Her blood pressure is 119/82 and her pulse is 63. Her respiration is 16 and oxygen saturation is 100%.   Vitals:    05/14/24 1401 05/14/24 1420 05/14/24 1424 05/14/24 1430   BP: 116/76 111/79 119/82 119/82   Pulse: 67 62 67 63   Resp:       Temp:       TempSrc:       SpO2:  99% 99% 100%   Weight:            Anesthesia Evaluation     Patient summary reviewed and Nursing notes reviewed    Airway   Mallampati: II  TM distance: >3 FB  Neck ROM: full  Dental - Dentition appears grossly intact     Pulmonary - negative ROS and normal exam   Cardiovascular - negative ROS and normal exam    Neuro/Psych - negative ROS     GI/Hepatic/Renal - negative ROS     Endo/Other    Abdominal                   Anesthesia Plan:   ASA:  2  Emergent    Plan:   Epidural  Post-op Pain Management: IV analgesics  Informed Consent Plan and Risks Discussed With:  Patient  Use of Blood Products Discussed With:  Patient  Blood Product Use Consented    Discussed plan with:  Surgeon      I have informed Ifrah Perea and/or legal guardian or family member of the nature of the anesthetic plan, benefits, risks including possible dental damage if relevant, major complications, and any alternative forms of anesthetic management.   All of the patient's questions were answered to the best of my ability. The patient desires the anesthetic management as planned.  MARIPOSA MULLIGAN MD  5/14/2024 2:45 PM  Present on Admission:  **None**

## 2024-05-15 RX ORDER — CHOLECALCIFEROL (VITAMIN D3) 25 MCG
1 TABLET,CHEWABLE ORAL DAILY
Status: DISCONTINUED | OUTPATIENT
Start: 2024-05-15 | End: 2024-05-16

## 2024-05-15 NOTE — ANESTHESIA POSTPROCEDURE EVALUATION
Patient: Ifrah Perea    Procedure Summary       Date: 05/14/24 Room / Location:     Anesthesia Start: 1120 Anesthesia Stop: 1920    Procedure: LABOR ANALGESIA Diagnosis:     Scheduled Providers:  Anesthesiologist: Jacinto Mulligan MD    Anesthesia Type: epidural ASA Status: 2 - Emergent            Anesthesia Type: epidural    Vitals Value Taken Time   /89 05/14/24 2017   Temp 99.2 °F (37.3 °C) 05/14/24 1919   Pulse 68 05/14/24 2017   Resp 14 05/14/24 2103   SpO2 98 % 05/14/24 1920       EM AN Post Evaluation:   Patient Evaluated in PACU  Patient Participation: complete - patient participated  Level of Consciousness: awake  Pain Management: adequate  Airway Patency:patent  Dental exam unchanged from preop  Yes    Cardiovascular Status: acceptable  Respiratory Status: acceptable  Postoperative Hydration acceptable      JACINTO MULLIGAN MD  5/14/2024 9:03 PM

## 2024-05-15 NOTE — DISCHARGE INSTRUCTIONS
Post Vaginal Delivery Home Care Instructions     We hope you were pleased with your care at [facility].  We wish you the best outcome and overall experience with the delivery of your baby.  These instructions will help to minimize pain, limit the risk for an infection, and improve the likelihood of a successful recovery.    What to Expect:  Abdominal cramping after delivery especially if you are breastfeeding.   Vaginal bleeding for about 4-6 weeks that may be followed by a yellow or white discharge for a few more weeks.  Your period will resume in approximately 6-8 weeks, unless you are breastfeeding.    If you are bottle feeding, you may notice breast engorgement in about 3 days.  Your breast may be sore and hard. Please wear a tight fitted bra or sports bra for 24-36 hours to help prevent your breast from producing milk, and use ice packs to relive any discomfort.  If you are breastfeeding, nipple dryness is very common the first few days.    Constipation is common after having a baby.  Please increase fluid and fiber in your diet.      Over-The-Counter Medication  Non-prescription anti-inflammatory medications can also help to ease the pain.  You may take Aleve, Tylenol or Ibuprofen   Colace or Metamucil for Constipation  Lanolin for dry nipples  Tucks, Witch Hazel and Epifoam for vaginal/perineum discomfort.   Drink a full glass of water with oral medication and take as directed.    Wound Care  The following instructions will promote proper healing and help to prevent infection  Vaginal/perineum Care: Sitz Bath for 15mins, 2-3 times a day,    Bathing/Showers  You may resume showers  No baths, swimming, hot tubs until your post-partum visit    Home Medication  Resume your home medications as instructed    Diet  Resume your normal diet    Activity  Refrain from vaginal intercourse, vaginal suppositories, tampon use or douches until after your post-partum visit.  No exercising for 4 weeks  You may  climb stairs minimally for the 1st week.    Do not do heavy housework for at least 2-3 weeks    Return to Work or School  You may return to work in approximately 6 weeks  Contact your obstetrician’s office, if you need a medical release. ([phone])    Driving  Avoid driving if you are taking narcotics for pain relief.    Follow-up Appointment with Your Obstetrician  Call your obstetrician’s office today for an appointment in 4-6 weeks.    The number is [phone].  Verify your appointment date, day, time, and location.  At your 1st post-partum office visit:  Your progress will be evaluated, findings reviewed, and any additional concerns and instructions will be discussed.    Questions or Concerns  Call your obstetrician’s office if you experience the following:  Severe pain not controlled by pain medication  Foul smelling vaginal discharge  Heavy bleeding  Shortness of breath  Fever  Redness, increased swelling or drainage from your incision  Crying and periods of sadness that prevents you from caring for yourself and your baby  Burning sensation during urination or inability to urinate  Swelling, redness or abnormal warmth to your leg/calf  Please call ([phone]). If your call is made after office hours, a physician will be available to help you.  There is always a provider covering our patients.    Thank you for coming to [facility] to start your new family.  The nurses and the anesthesiologists try very hard to make sure you receive the best care possible.  Your trust in them as well as us is greatly appreciated.    Thanks so much,   The Providers of [facility] Obstetrics and Gynecology     [facility] has great support for our families even after discharge.  We have virtual or in-person support groups.  Visit our website for the most up-to-date info for our many different support groups. https://www.[facility].org/services/[email or web address]-[email or web address]/resources/       Outpatient Lactation appoints.   Call [phone]- to schedule an appt.  Our office is located in the Maternal Fetal Medicine office next to [facility] on the first floor.      Moms Support Groups  Our weekly New Mom Support Groups are for any new parents in our community. They are led by an experienced Mother/Baby nurse or IBCLC and usually include a guest speaker on a topic of interest to new parents. These in-person groups also include Breastfeeding Support at each meeting. Bring your baby ( - 6 months) with you! Moms-to-be are also welcome! All mom's welcome even if its not your first.     MOM & BABY HOUR   Meets most  10:00 - 11:30 a.m.  Masks are not required, but be considerate of others and do not attend if mom or baby have had any symptoms of illness within the previous 24 hours. Breastfeeding support will be included at each session--just ask the leader any breastfeeding questions you may have. Location [facility] [place] [address] [place] Go inside the front door and to the right to the “Community Education Room”.    Mom's Line: [phone]  A phone line dedicated for women (or anyone worried about a women) who may be experiencing signs or symptoms of postpartum depression, anxiety, or overwhelmed with new baby. Call - answered by live trained mental health professionals, free and confidential, emotional support, referrals, in any language.    Nurturing Mom- A support group for new and expectant moms looking for support with the transition to parenthood as well as those experiencing symptoms of  anxiety and/or depression.  Please contact [email or web address] if you need directions or the link for the virtual meetings. Please contact @[email or web address] if you plan to attend, but please be considerate of others and do not attend if mom or baby have had any symptoms of illness within the previous 24 hours.     La Leche League for breast feeding and parent support,  Website: IIIDailyBooth.org  and for the [place] group and other groups visit https://[email or web address]/pg/[email or web address]/[email or web address]  to help find a group, all meetings are virtual.     Facebook groups-  for more support when home- [facility]ies & Mommies of [facility] --- you can find mom-to-mom advice and the list of speaker topics for cradle talk program.  Telephone Support  Postpartum depression [place] (www.ppdil.org)  -Free information and support for pregnant and postpartum women with symptoms of depression, anxiety  -Mom-to-mom support from volunteers who have been through depression    Telephone support: ([phone]  Urgent support:(387)-[phone] (answered 24/7)  Email support: support@ppdil.org    Postpartum Support International (www.postpartum.net)  -Free phone conversation with trained volunteers   (999) 658-5025; after the prompt enter 56198#    National Postpartum Depression Hotline  (528)-PPD-MOMS    Helpful websites:    www.llli.[employer]  www.ezeep  www.Breastfeed[place].org    Initiation of breast pumping after discharge:     - Add 1 pumping session a day, additional to the infant's feedings, 2-3 weeks after delivery.     - Pump both breasts for 15 mins, immediately after a breast feeding session.    - Pumping first thing in the morning will provide greater output.    - If you chose to pump more than once a day, you should be consistent every day to prevent a breast infection.      - Pump using an electric pump over a hands free pump (electric pumps provided stronger stimulation to the nipples).    - Store the breast milk in 2-3 oz containers.     - Label containers with date and time.    - Always feed oldest milk first.

## 2024-05-15 NOTE — PROGRESS NOTES
[facility]  Obstetrics and Gynecology    OB/GYN: Postpartum Progress Note     SUBJECTIVE:  Patient is a 29 year old  female who is s/p . She is PPD# 1.   Doing well. Noted no c/o. Denies fever, chills, N, V, chest pain and SOB. Bleeding has been stable. Voiding without difficulty.   Ambulating without difficulty. .     OBJECTIVE:  Vitals:    24 2140 24 2200 05/15/24 0110 05/15/24 0410   BP: 110/75 135/90 111/72 111/85   Pulse: 71 58 62 60   Resp:  18 18 18   Temp:  98.7 °F (37.1 °C) 98 °F (36.7 °C) 98.2 °F (36.8 °C)   TempSrc:  Oral Oral Oral   SpO2:       Weight:           Physical Exam:    General:    alert, appears stated age, and cooperative   Lochia:  appropriate   Uterine Fundus:   firm at umbilicus   DVT Evaluation:  No evidence of DVT seen on physical exam.      Labs:  Recent Labs   Lab 24  0549   RBC 4.47   HGB 13.5   HCT 39.4   MCV 88.1   MCH 30.2   MCHC 34.3   RDW 13.5   NEPRELIM 5.49   WBC 8.7   .0       ASSESSMENT/PLAN:  Patient is a 29 year old  female who is s/p  PPD# 1.     Doing well   Continue routine postpartum care  Vitals per routine   Encourage ambulation     Plan for discharge to home likely tomorrow    The patient's mother desires circumcision.   Mother understands there is no medical indication for circumcision. We discussed AAP opinion on procedure as well. She was consented for infant circumcision risks including, but not limited to: bleeding, infection, trauma to other tissue, and need for further procedures.  The mother expressed understanding, questions were answered and she  wishes to proceed with the procedure for her son.    MD EDUARDO WebberG OBGYN

## 2024-05-15 NOTE — LACTATION NOTE
This note was copied from a baby's chart.  LACTATION NOTE - INFANT    Evaluation Type  Evaluation Type: Inpatient    Problems & Assessment  Problems Diagnosed or Identified: Sleepy  Infant Assessment: Hunger cues present  Muscle tone: Appropriate for GA    Feeding Assessment  Summary Current Feeding: Breastfeeding exclusively  Breastfeeding Assessment: Assisted with breastfeeding w/mother's permission;Calm and ready to breastfeed;Coordinated suck/swallow  Breastfeeding Positions: cradle;cross cradle;right breast  Latch: Repeated attempts, hold nipple in mouth, stimulate to suck  Audible Sucks/Swallows: A few with stimulation  Type of Nipple: Everted (after stimulation)  Comfort (Breast/Nipple): Filling, red/small blisters/bruises, mild/mod discomfort  Hold (Positioning): Full assist, teach one side, mother does other, staff holds  LATCH Score: 6

## 2024-05-15 NOTE — PROGRESS NOTES
Patient up to bathroom with assist x 2.  Voided 1100ml. Patient transferred to mother/baby room 355 per wheelchair in stable condition with baby and personal belongings.  Accompanied by significant other and staff.  Report given to Denae mother/baby BHARATHI.

## 2024-05-15 NOTE — L&D DELIVERY NOTE
Yandel Perea [[id number]]      Labor Events      Rupture date/time: 2024 1109     Rupture type: SROM  Fluid color: Clear       Labor Event Times    Dilation complete date/time: 2024       Cougar Presentation    No data filed       Operative Delivery    No data filed       Shoulder Dystocia    No data filed       Anesthesia    No data filed        Delivery      Head delivery date/time: 2024 19:16:37   Delivery date/time:  24 19:17:00   Delivery type: Normal spontaneous vaginal delivery    Details:     Delivery location: delivery room       Delivery Providers       Delivery personnel:  Provider Role    Baby Nurse    Delivery Nurse             Cord    No data filed        Measurements    No data filed       Placenta    Date/time: 2024 1920  Removal: Spontaneous  Appearance: Intact       Apgars    No data filed       Skin to Skin    No data filed       Vaginal Count    No data filed       Lacerations    No data filed            [facility]   Vaginal Delivery Procedure Note      Ifrah Perea Patient Status:  Inpatient    1995 MRN [id number]   Location [facility] C[facility] Attending Geraldine Edwards*   Hosp Day # 0 PCP Becky Marc MD     Date of Delivery: 24    Pre procedure diagnosis:  IUP at Term, IOL    Post procedure diagnosis: Same, delivered    Procedure: Normal Spontaneous Vaginal Delivery    Attending: Luisa Mcintyre DO      Anesthesia: Epidural    EBL:  340 mL    Indications:  Patient is a 29 year old  at 39w6d who presented for induction of labor, she received pitocin, AROM and progressed to complete cervical dilation.      Findings:    Sex: male     Weight:7#10oz      Apgars: see recorded numbers      Lacerations: 1st degree perineal laceration, no periurethral, no cervical       Procedure:  The patient was confirmed to be completely dilated. The patients was placed in the dorsolithotomy  position.  She was then encouraged to push.  As the head was delivered in MANNY position, the perineum was supported to decrease the risk of tearing. The shoulders rotated spontaneously and delivery was completed without complication.  The cord was doubly clamped then cut after 60 seconds.  The baby was placed on the mother's abdomen.  IV pitocin bolus was initiated. The cord blood was sampled. The placenta then delivered intact. The uterus was noted to be firm at fundus but atonic in lower uterine segment with some extra bleeding. TXA was given. Improvement in tone and bleeding was noted shortly after. The perineum, vaginal mucosa and cervix was then examined.     A 1st degree perineal laceration repaired with 2-0 vicryl.  Bleeding was minimal.  The patient was then moved to the supine position in stable condition.  Sponge, needle, and instrument counts were correct.    Complications:  None     Mother and infant in good condition in the delivery room.    DO VIPUL Chatterjee

## 2024-05-16 VITALS
WEIGHT: 146 LBS | TEMPERATURE: 98 F | DIASTOLIC BLOOD PRESSURE: 88 MMHG | SYSTOLIC BLOOD PRESSURE: 129 MMHG | RESPIRATION RATE: 16 BRPM | BODY MASS INDEX: 25 KG/M2 | HEART RATE: 62 BPM | OXYGEN SATURATION: 98 %

## 2024-05-16 LAB
BASOPHILS # BLD AUTO: 0.04 X10(3) UL (ref 0–0.2)
BASOPHILS NFR BLD AUTO: 0.4 %
DEPRECATED RDW RBC AUTO: 45 FL (ref 35.1–46.3)
EOSINOPHIL # BLD AUTO: 0.19 X10(3) UL (ref 0–0.7)
EOSINOPHIL NFR BLD AUTO: 1.7 %
ERYTHROCYTE [DISTWIDTH] IN BLOOD BY AUTOMATED COUNT: 13.9 % (ref 11–15)
HCT VFR BLD AUTO: 34.1 %
HGB BLD-MCNC: 11.8 G/DL
IMM GRANULOCYTES # BLD AUTO: 0.05 X10(3) UL (ref 0–1)
IMM GRANULOCYTES NFR BLD: 0.5 %
LYMPHOCYTES # BLD AUTO: 2.79 X10(3) UL (ref 1–4)
LYMPHOCYTES NFR BLD AUTO: 25.4 %
MCH RBC QN AUTO: 30.9 PG (ref 26–34)
MCHC RBC AUTO-ENTMCNC: 34.6 G/DL (ref 31–37)
MCV RBC AUTO: 89.3 FL
MONOCYTES # BLD AUTO: 0.74 X10(3) UL (ref 0.1–1)
MONOCYTES NFR BLD AUTO: 6.7 %
NEUTROPHILS # BLD AUTO: 7.18 X10 (3) UL (ref 1.5–7.7)
NEUTROPHILS # BLD AUTO: 7.18 X10(3) UL (ref 1.5–7.7)
NEUTROPHILS NFR BLD AUTO: 65.3 %
PLATELET # BLD AUTO: 130 10(3)UL (ref 150–450)
PLATELETS.RETICULATED NFR BLD AUTO: 9.2 % (ref 0–7)
RBC # BLD AUTO: 3.82 X10(6)UL
WBC # BLD AUTO: 11 X10(3) UL (ref 4–11)

## 2024-05-16 RX ORDER — GABAPENTIN 300 MG/1
300 CAPSULE ORAL 3 TIMES DAILY
Qty: 21 CAPSULE | Refills: 0 | Status: SHIPPED | OUTPATIENT
Start: 2024-05-16 | End: 2024-05-23

## 2024-05-16 RX ORDER — DOCUSATE SODIUM 100 MG/1
100 CAPSULE, LIQUID FILLED ORAL 2 TIMES DAILY
Qty: 60 CAPSULE | Refills: 0 | Status: SHIPPED | OUTPATIENT
Start: 2024-05-16 | End: 2024-06-15

## 2024-05-16 RX ORDER — POLYETHYLENE GLYCOL 3350 17 G/17G
17 POWDER, FOR SOLUTION ORAL 2 TIMES DAILY PRN
Qty: 60 EACH | Refills: 0 | Status: SHIPPED | OUTPATIENT
Start: 2024-05-16 | End: 2024-06-15

## 2024-05-16 RX ORDER — IBUPROFEN 600 MG/1
600 TABLET ORAL EVERY 6 HOURS PRN
Qty: 60 TABLET | Refills: 0 | Status: SHIPPED | OUTPATIENT
Start: 2024-05-16

## 2024-05-16 RX ORDER — ACETAMINOPHEN 325 MG/1
325 TABLET ORAL EVERY 6 HOURS PRN
Qty: 60 TABLET | Refills: 0 | Status: SHIPPED | OUTPATIENT
Start: 2024-05-16

## 2024-05-16 NOTE — DISCHARGE SUMMARY
[facility]  part of [facility]    Discharge Summary    Ifrah Perea Patient Status:  Inpatient    1995 MRN [id number]   Location [facility] 3SE Attending Geraldine Edwards*   Hosp Day # 2 PCP Becky Marc MD     Date of Admission: 2024    Date of Discharge: 24    Admission Diagnoses: preganacy  Pregnancy (HCC) at 39w6d     Secondary Diagnosis: None    Primary OB Clinician: [facility]    Hospital Course:     EDC: Estimated Date of Delivery: 5/15/24    Gestational Age: 39w6d    Date of Delivery: 24    Antepartum complications: none    Delivered By: Dr. Luisa Mcintyre     Delivery Type:   Tubal Ligation: n/a    Baby: Liveborn male, circumcision performed.     Apgars:  1 minute:   9                 5 minutes: 9  Anesthesia: epidural          Intrapartum Complications: None    Laceration: 1st degree    Episiotomy: none    Placenta: spontaneous    Feeding Method: both breast and bottle fed     Rh Immune Globulin Given: no    Rubella Vaccine Given: no        Discharge Plan:   Discharge Condition: Good  Early Discharge:  NO    Discharge medications:  Current Discharge Medication List        New Orders    Details   ibuprofen 600 MG Oral Tab Take 1 tablet (600 mg total) by mouth every 6 (six) hours as needed for Pain.      acetaminophen 325 MG Oral Tab Take 1 tablet (325 mg total) by mouth every 6 (six) hours as needed for Pain.      docusate sodium 100 MG Oral Cap Take 1 capsule (100 mg total) by mouth 2 (two) times daily.      polyethylene glycol, PEG 3350, 17 g Oral Powd Pack Take 17 g by mouth 2 (two) times daily as needed.      gabapentin 300 MG Oral Cap Take 1 capsule (300 mg total) by mouth in the morning, at noon, and at bedtime for 7 days.           Home Meds - Unchanged    Details   Calcium Carbonate 600 MG Oral Tab Take 1 tablet (600 mg total) by mouth.      levothyroxine 88 MCG Oral Tab Take 1 tablet (88 mcg total) by mouth before breakfast.       Prenatal MV-Min-Fe Fum-FA-DHA (PRENATAL 1 OR) Take by mouth.      Cholecalciferol (VITAMIN D) 50 MCG (2000 UT) Oral Cap Take by mouth.                   Discharge Diet: As tolerated and General diet    Discharge Activity: Pelvic rest until cleared and As tolerated    Follow up:      Follow-up Information       [facility]. Call.    Specialty: Pediatrics  Why: As needed  Contact information:  [address]  [facility] [id number] [phone]  Additional information:  Masks are optional for all patients and visitors, unless otherwise indicated.             Luisa Mcintyre, DO. Schedule an appointment as soon as possible for a visit in 6 week(s).    Specialty: OBSTETRICS & GYNECOLOGY  Why: Routine postpartum visit  Contact information:  [address]  [facility] [address] [phone]                             Follow up Labs: None      Other Discharge Instructions:           Post Vaginal Delivery Home Care Instructions     We hope you were pleased with your care at [facility].  We wish you the best outcome and overall experience with the delivery of your baby.  These instructions will help to minimize pain, limit the risk for an infection, and improve the likelihood of a successful recovery.    What to Expect:  Abdominal cramping after delivery especially if you are breastfeeding.   Vaginal bleeding for about 4-6 weeks that may be followed by a yellow or white discharge for a few more weeks.  Your period will resume in approximately 6-8 weeks, unless you are breastfeeding.    If you are bottle feeding, you may notice breast engorgement in about 3 days.  Your breast may be sore and hard. Please wear a tight fitted bra or sports bra for 24-36 hours to help prevent your breast from producing milk, and use ice packs to relive any discomfort.  If you are breastfeeding, nipple dryness is very common the first few days.    Constipation is common after having a baby.   Please increase fluid and fiber in your diet.      Over-The-Counter Medication  Non-prescription anti-inflammatory medications can also help to ease the pain.  You may take Aleve, Tylenol or Ibuprofen   Colace or Metamucil for Constipation  Lanolin for dry nipples  Tucks, Witch Hazel and Epifoam for vaginal/perineum discomfort.   Drink a full glass of water with oral medication and take as directed.    Wound Care  The following instructions will promote proper healing and help to prevent infection  Vaginal/perineum Care: Sitz Bath for 15mins, 2-3 times a day,    Bathing/Showers  You may resume showers  No baths, swimming, hot tubs until your post-partum visit    Home Medication  Resume your home medications as instructed    Diet  Resume your normal diet    Activity  Refrain from vaginal intercourse, vaginal suppositories, tampon use or douches until after your post-partum visit.  No exercising for 4 weeks  You may climb stairs minimally for the 1st week.    Do not do heavy housework for at least 2-3 weeks    Return to Work or School  You may return to work in approximately 6 weeks  Contact your obstetrician’s office, if you need a medical release. ([phone])    Driving  Avoid driving if you are taking narcotics for pain relief.    Follow-up Appointment with Your Obstetrician  Call your obstetrician’s office today for an appointment in 4-6 weeks.    The number is [phone].  Verify your appointment date, day, time, and location.  At your 1st post-partum office visit:  Your progress will be evaluated, findings reviewed, and any additional concerns and instructions will be discussed.    Questions or Concerns  Call your obstetrician’s office if you experience the following:  Severe pain not controlled by pain medication  Foul smelling vaginal discharge  Heavy bleeding  Shortness of breath  Fever  Redness, increased swelling or drainage from your incision  Crying and periods of sadness that prevents you from  caring for yourself and your baby  Burning sensation during urination or inability to urinate  Swelling, redness or abnormal warmth to your leg/calf  Please call ([phone]). If your call is made after office hours, a physician will be available to help you.  There is always a provider covering our patients.    Thank you for coming to [facility] to start your new family.  The nurses and the anesthesiologists try very hard to make sure you receive the best care possible.  Your trust in them as well as us is greatly appreciated.    Thanks so much,   The Providers of [facility] Obstetrics and Gynecology     [facility] has great support for our families even after discharge.  We have virtual or in-person support groups.  Visit our website for the most up-to-date info for our many different support groups. [email or web address]       Outpatient Lactation appoints.  Call [phone]- to schedule an appt.  Our office is located in the Maternal Fetal Medicine office next to [facility] on the first floor.      Moms Support Groups  Our weekly New Mom Support Groups are for any new parents in our community. They are led by an experienced Mother/Baby nurse or IBCLC and usually include a guest speaker on a topic of interest to new parents. These in-person groups also include Breastfeeding Support at each meeting. Bring your baby ( - 6 months) with you! Moms-to-be are also welcome! All mom's welcome even if its not your first.     MOM & BABY HOUR   Meets most  10:00 - 11:30 a.m.  Masks are not required, but be considerate of others and do not attend if mom or baby have had any symptoms of illness within the previous 24 hours. Breastfeeding support will be included at each session--just ask the leader any breastfeeding questions you may have. Location [facility] [place] [address] [place] Go inside the front door and to the  right to the “Community Education Room”.    Mom's Line: [phone]  A phone line dedicated for women (or anyone worried about a women) who may be experiencing signs or symptoms of postpartum depression, anxiety, or overwhelmed with new baby. Call - answered by live trained mental health professionals, free and confidential, emotional support, referrals, in any language.    Nurturing Mom- A support group for new and expectant moms looking for support with the transition to parenthood as well as those experiencing symptoms of  anxiety and/or depression.  Please contact [email or web address] if you need directions or the link for the virtual meetings. Please contact [email or web address] if you plan to attend, but please be considerate of others and do not attend if mom or baby have had any symptoms of illness within the previous 24 hours.     La Leche League for breast feeding and parent support, Website: [employer]  and for the [place] group and other groups visit [email or web address]  to help find a group, all meetings are virtual.     Facebook groups-  for more support when home- Babies & Mommies of [facility] --- you can find mom-to-mom advice and the list of speaker topics for cradle talk program.  Telephone Support  Postpartum depression [place] (www.ppdil.org)  -Free information and support for pregnant and postpartum women with symptoms of depression, anxiety  -Mom-to-mom support from volunteers who have been through depression    Telephone support: ([phone]  Urgent [phone])-[phone] (answered )  Email support: support@ppdil.org    Postpartum Support International (www.postpartum.net)  -Free phone conversation with trained volunteers   (430) 190-1006; after the prompt enter 77048#    National Postpartum Depression Hotline  (358)-PPD-MOMS    Helpful websites:    www.llli.org  www.5211game  www.Breastfeed[place].org    Initiation  of breast pumping after discharge:     - Add 1 pumping session a day, additional to the infant's feedings, 2-3 weeks after delivery.     - Pump both breasts for 15 mins, immediately after a breast feeding session.    - Pumping first thing in the morning will provide greater output.    - If you chose to pump more than once a day, you should be consistent every day to prevent a breast infection.      - Pump using an electric pump over a hands free pump (electric pumps provided stronger stimulation to the nipples).    - Store the breast milk in 2-3 oz containers.     - Label containers with date and time.    - Always feed oldest milk first.                           Dr. Zechariah De Los Santos MD    [facility]     This note was created by [employer] voice recognition. Errors in content may be related to improper recognition by the system; efforts to review and correct have been done but errors may still exist. Please be advised the primary purpose of this note is for me to communicate medical care. Standard sentence structure is not always used. Medical terminology and medical abbreviations may be used. There may be grammatical, typographical, and automated fill ins that may have errors missed in proofreading.

## 2024-05-16 NOTE — PLAN OF CARE
Problem: Patient Centered Care  Goal: Patient preferences are identified and integrated in the patient's plan of care  Description: Interventions:  - What would you like us to know as we care for you?   - Provide timely, complete, and accurate information to patient/family  - Incorporate patient and family knowledge, values, beliefs, and cultural backgrounds into the planning and delivery of care  - Encourage patient/family to participate in care and decision-making at the level they choose  - Honor patient and family perspectives and choices  Outcome: Adequate for Discharge

## 2024-05-16 NOTE — PROGRESS NOTES
Patient and family ready for discharge per MD orders. D/c instructions reviewed with family, verbalize understanding. All questions answered. Encouraged to call MD with any questions or concerns. Aware of need to set follow up appt and verbalize understanding of all medications. Patient and family left at this time with all belongings. Escorted out in stable condition with partner and baby  to own vehicle.

## 2024-05-16 NOTE — PLAN OF CARE
Problem: Patient Centered Care  Goal: Patient preferences are identified and integrated in the patient's plan of care  Description: Interventions:  - What would you like us to know as we care for you?   - Provide timely, complete, and accurate information to patient/family  - Incorporate patient and family knowledge, values, beliefs, and cultural backgrounds into the planning and delivery of care  - Encourage patient/family to participate in care and decision-making at the level they choose  - Honor patient and family perspectives and choices  Outcome: Progressing      Problem: BIRTH - VAGINAL/ SECTION  Goal: Fetal and maternal status remain reassuring during the birth process  Description: INTERVENTIONS:  - Monitor vital signs  - Monitor fetal heart rate  - Monitor uterine activity  - Monitor labor progression (vaginal delivery)  - DVT prophylaxis (C/S delivery)  - Surgical antibiotic prophylaxis (C/S delivery)  Outcome: Progressing     Problem: PAIN - ADULT  Goal: Verbalizes/displays adequate comfort level or patient's stated pain goal  Description: INTERVENTIONS:  - Encourage pt to monitor pain and request assistance  - Assess pain using appropriate pain scale  - Administer analgesics based on type and severity of pain and evaluate response  - Implement non-pharmacological measures as appropriate and evaluate response  - Consider cultural and social influences on pain and pain management  - Manage/alleviate anxiety  - Utilize distraction and/or relaxation techniques  - Monitor for opioid side effects  - Notify MD/LIP if interventions unsuccessful or patient reports new pain  - Anticipate increased pain with activity and pre-medicate as appropriate  Outcome: Progressing     Problem: ANXIETY  Goal: Will report anxiety at manageable levels  Description: INTERVENTIONS:  - Administer medication as ordered  - Teach and rehearse alternative coping skills  - Provide emotional support with 1:1 interaction with  staff  Outcome: Progressing

## 2024-05-16 NOTE — PROGRESS NOTES
[facility]  Obstetrics and Gynecology    OB/GYN: Postpartum Progress Note     SUBJECTIVE:  Patient is a 29 year old  female who is s/p . She is PPD# 2.   Doing well. Noted general soreness otherwise no complaints.  Desires discharge home today. Denies fever, chills, N, V, chest pain and SOB. Bleeding has been stable. Voiding without difficulty.  Passing flatus.  + Bm. Tolerating general diet. Ambulating without difficulty. .     OBJECTIVE:  Vitals:    05/15/24 0110 05/15/24 0410 05/15/24 0850 05/15/24 195   BP: 111/72 111/85 111/78 107/76   Pulse: 62 60 66 67   Resp: 18 18 16 16   Temp: 98 °F (36.7 °C) 98.2 °F (36.8 °C) 98.2 °F (36.8 °C) 97.5 °F (36.4 °C)   TempSrc: Oral Oral Oral Oral   SpO2:       Weight:           Physical Exam:    General:    alert, appears stated age, and cooperative   Lochia:  appropriate   Uterine Fundus:   firm at the umbilicus   Perineum:  healing well    DVT Evaluation:  No evidence of DVT seen on physical exam.     Urinary output is adequate. (When recorded)    Labs:  Recent Labs   Lab 24  0549   RBC 3.82   HGB 11.8*   HCT 34.1*   MCV 89.3   MCH 30.9   MCHC 34.6   RDW 13.9   NEPRELIM 7.18   WBC 11.0   .0*       ASSESSMENT/PLAN:  Patient is a 29 year old  female who is s/p  PPD# 2.     Doing well   Continue routine postpartum care  Vitals per routine   Encourage ambulation   Plan for discharge to home today.  Follow up in 6 weeks      Dr. Zechariah De Los Santos MD    [facility]     This note was created by [employer] voice recognition. Errors in content may be related to improper recognition by the system; efforts to review and correct have been done but errors may still exist. Please be advised the primary purpose of this note is for me to communicate medical care. Standard sentence structure is not always used. Medical terminology and medical abbreviations may be used. There may be grammatical, typographical, and automated fill ins that may  have errors missed in proofreading.

## 2024-05-16 NOTE — LACTATION NOTE
05/16/24 0845   Evaluation Type   Evaluation Type Inpatient   Problems identified   Problems identified Knowledge deficit;Nipple pain   Problems Identified Other Mild nipple soreness to right nipple d/t periodic shallow latching.   Maternal history   Maternal history Hypothyroid;Induction of labor   Breastfeeding goal   Breastfeeding goal To maintain breast milk feeding per patient goal   Maternal Assessment   Bilateral Breasts Symmetrical;Soft   Bilateral Nipples Colostrum easily expressed;Everted   Prior breastfeeding experience (comment below) Primip   Breastfeeding Assistance Breastfeeding assistance provided with permission;Hand expression provided with permission   Pain assessment   Location/Comment \"Mild\" soreness, no pain with current latch   Treatment of Sore Nipples Deeper latch techniques;Expressed breast milk;Lanolin   Guidelines for use of:   Breast pump type Spectra;Tara;Hand Pump   Current use of pump: Reviewed indications for use and differences between plug in vs hands free pumps   Suggested use of pump Pump if infant is not latching to breast;Pump each time a supplement is offered   Other (comment) Ifrah (Mom) endorses that she feels overall baby is BF well, some latches feel shallow/nipple is flattened when baby releases from breast. Reviewed breastfeeding positioning and deep latch techniques. Assisted with positioning, breast shaping, and deep latch achieved using cross cradle hold with U shaping of breast tissue. Mom reports latch feels comfortable and audible swallows noted. After fifteen minutes on right breast baby became sleepy despite gentle waking/stimulation techniques, transferred baby to left breast. Deep latch achieved utilizing football hold to left breast, audible swallows noted. Reviewed breastfeeding, signs of breastfeeding adequacy, when supplementation may be medically indicated, pumping, and milk storage guidelines. Reviewed and encouraged use of outpatient lactation  services such as [facility] # and for OPV as needed.

## 2024-05-23 ENCOUNTER — NURSE ONLY (OUTPATIENT)
Dept: LACTATION | Facility: HOSPITAL | Age: 29
End: 2024-05-23
Attending: OBSTETRICS & GYNECOLOGY

## 2024-05-29 ENCOUNTER — LACTATION ENCOUNTER (OUTPATIENT)
Dept: LACTATION | Facility: HOSPITAL | Age: 29
End: 2024-05-29

## 2024-05-29 ENCOUNTER — NURSE ONLY (OUTPATIENT)
Dept: LACTATION | Facility: HOSPITAL | Age: 29
End: 2024-05-29
Attending: OBSTETRICS & GYNECOLOGY

## 2024-05-29 DIAGNOSIS — O92.4 HYPOGALACTIA WITH INFANT BREAST ATTACHMENT DIFFICULTY (HCC): ICD-10-CM

## 2024-05-29 DIAGNOSIS — O92.29 SORE NIPPLES DUE TO LACTATION (HCC): ICD-10-CM

## 2024-05-29 PROCEDURE — 99214 OFFICE O/P EST MOD 30 MIN: CPT

## 2024-05-29 NOTE — LACTATION NOTE
05/29/24 1400   Evaluation Type   Evaluation Type Outpatient Initial   Problems identified   Problems identified Knowledge deficit;Milk supply not WNL  (Infant TT, s/p release 6 days ago)   Milk supply not WNL Hypogalactia;Reduced (potential)   Problems Identified Other Ifrah presents with 15 day old Baljit for breastfeeding evaluation reporting the following: hx of dehydration requring hospital admission for IVF, s/p tongue tie release 6 days ago (ENT with exercises recommended/performed 4X/day for 4-6 weeks), breastfeeding has improved evident by Pre/Post weight visits at pediatric office, hx of nipple pain now improved/healed, Ifrah pumps 3-4 times daily meeting 3/5 of supplementation needs (volume ranges 0.5-2 oz using Spectra 1 pump. Appropriate flange size determined/recommended today.   Maternal history   Maternal history Hypothyroid;Induction of labor   Other/comment Hashimotos, multinodular thyroid, synthroid dose reduced in pregnancy.   Breastfeeding goal   Breastfeeding goal To maintain breast milk feeding per patient goal   Maternal Assessment   Bilateral Breasts Symmetrical;Soft   Bilateral Nipples Colostrum easily expressed;Everted;Sore;Healing well   Prior breastfeeding experience (comment below) Primip   Breastfeeding Assistance Breastfeeding assistance provided with permission;Pumping assistance provided with permission;Breast exam provided with permission   Pain assessment   Pain, additional Pain location;Pain w/pumping;Pain w/initial sucks only   Pain Location Nipples   Pain scale comment reviewed pump settings and flange fit   Treatment of Sore Nipples Deeper latch techniques;Expressed breast milk   Guidelines for use of:   Breast pump type Spectra   Current use of pump: 3-4/day   Suggested use of pump For comfort as needed;Pump each time a supplement is offered;Pump if infant is not latching to breast   Reported pumping volumes (ml) 0.5-2 oz   Post-feed pumped volume 15 ml pumped following  infant transfer 64 ml   Other (comment) see pt instructions

## 2024-05-29 NOTE — PATIENT INSTRUCTIONS
[facility]  Ashley Herrera RN, BSN, IBCLC  [phone]      Birth Weight: 7 lb 10.1 oz  Today's Naked Weight: 7 lb 15.7 oz at 15 days of age, currently breastfeeding from both breasts each feeding, pumping 3-4/day, supplementing by bottle 8 oz (breast milk, 5 oz & formula, 3 oz)      Baljit transferred 64 ml from breast today (16 right, 48 left). A summary of topics we discussed today is below the feeding plan developed today.     FEEDING PLAN    Breastfeeding frequency: Continue to offer breast with each feeding using deep latch techniques (chin to breast vs nose) and proper support (MyBreast Friend Pillow used today), starting on right more often to increase supply on right when Baljit is willing/able. Suggestions made today: use cross cradle hold left, football hold right due to favoring of right head turning, encourage left turning with skin to skin/tummy time and diaper change positioning,  Make the best of 20-30 minutes (+/-) when feeding at breast, apply breast compressions and provide gentle stimulation as needed to encourage efficiency at breast. Provide oral exercises to strengthen reflexive suck patterns post tongue tie procedure 3-4 times daily (provided handout).    Supplementation: Continue to supplement by bottle as discussed today, until effective breastfeeding is observed consistently. Offer bottle (breastmilk/formula) to satiety following a breastfeeding session ~1 oz as needed. See paced bottle feeding below if supplementation by bottle is indicated.    Pumping: Continue to pump with each bottle given/indicated. Adjust pump settings: increase vacuum/suction to maximum level that is comfortably tolerated ~ adjust stimulation mode/expression mode according to milk release.     Follow up: With Pediatrician as directed. With lactation   6/10/24 @ 2 pm. Refer to additional support groups/resources below.          ADDITIONAL INFORMATION:     Snuggle your baby in skin to  skin contact between and during feedings whenever possible.    Massage your breasts before nursing or pumping to soften areola if needed.    Breastfeed with hunger cues: Most babies will breastfeed 8-12 times every 24 hours with some clustered breastfeeding, especially during growth spurts.     Positioning:   Baby facing mom with mouth at nipple level. Bring infant to the breast not the breast to the infant.  Make sure infant is fully turned towards you with head aligned with the shoulders for latch and arms hugging you.  Baby should approach breast reaching up with the chin lifted.  Chin is deep into the breast and nose is slightly away from breast after latch.  Make small adjustments in position for your comfort and to help make space for the infant's nose if needed.    Deep Latching on:  Express drops of milk onto your baby’s lips to encourage latching if needed.  Aim your nipple to baby’s nose  Wait for a wide mouth and push your nipple in the mouth as you bring infant fully onto the breast.  Observe for mouth \"planted\" on the breast and for good jaw movement with suck.    Is baby taking enough breast milk?  Swallowing with most sucks (every 1-3 sucks) until satisfied at least 8-12 times every 24 hours.  Compressing the breast when your baby sucks can increase milk flow.  At least 6-8 wet diapers and at least 3-4 soft, yellow seedy stools every 24 hours. Use the breastfeeding journal to keep a record.   Weight gain of at least 5-7 ounces per week for the first 3 months after return to birth weight.    Supplement when:    Breastfeeding session does not meet adequate feeding guidelines above.  Your baby's doctor has advised that supplementation is needed.  Use your expressed breast milk as the supplement or formula if breast milk does not meet volume infant requires.    Hour of Age  Intake (mL/feed)  1st 24 hours 2-10  24-48 hours 5-15  48-72 hours 15-30  72-96 hours 30-60  Day 10: 2-3 ounces per feeding.  4  weeks: 3-4 ounces or more per feeding.    Paced bottle feeding using a slow flow nipple:     Hold your baby in an upright position, supporting the hand and neck with your hand, rather than in the crook of your arm.   Let your baby “latch on” to the bottle: stroke nipple down from top lip to bottom, licking is good, wait for wide mouth and insert nipple with lips on base.  Angle the bottle so flow is slower. If the bottle is vertical milk will flow to quickly.  Pausing mimics breastfeeding and discourages “guzzling” the feeding.      Do I need to pump my breasts?  If supplementing:  Pump both breasts each time a supplement is given until infant nursing well.  Pump for 10-15 minutes using double electric breast pump.  Save all expressed breast milk for your infant.    Remember the helpful website to improve your production that helps [email or web address]/newborns/professional-education/breastfeeding/maximizing-milk-production.html    Breastfeeding Journal:  Write down your baby’s feedings and diapers - if not meeting the guideline for number of diapers or feedings, call your baby’s doctor.      Follow up with your OB healthcare provider:  Call if a plugged duct or engorgement persists greater than 48 hours. Call if firm or reddened spots are present in breast with signs of fever, chills or flu like symptoms (possible breast infection/mastitis). Check your temperature during engorgement.      Care for nipples until healed:     Express drops of breast milk on nipples before and after nursing (unless nipple thrush is suspected or present).  Use a hydrogel type dressing on your nipples between feedings. (Soothies or Ameda Comfort Gel pads)  Or, use Lanolin every time after breastfeeding. (Do not combine with use of gel pads)  If too sore to nurse on one or both breasts, pump one (or both) breast(s) to comfort every 2-3 hours. If nursing to contentment on one breast, this pumped milk can be stored for future use. If  not nursing on either breast, feed baby your breast milk until able to return to breast.   Discuss use of all purpose nipple ointment with your OB doctor.   Call doctor if nipple has signs of infection: red/deep pink, drainage (pus), increased pain, fever.         Call your OB doctor with any signs of   mastitis (breast infection)    Plugged area(s) are not soft within 24 hours.   Breast becomes firm, reddened, or painful.   Fever, chills, or flu-like symptoms. Check your temperature 3 times daily until a plugged duct or mastitis resolves.    If mastitis occurs:  Continue breastfeeding and/or pumping - your milk is not infected.   Continue above treatment for relieving plugged area(s)  Continue to take antibiotic as prescribed even though you may quickly feel better.   Contact your doctor is you are not feeling significantly better within 1-2 days of starting antibiotic, sooner if symptoms worsen.      Call the lactation consultants at [phone] as needed.         Increasing Milk Production Using a Breast Pump       Kangaroo mother care: Snuggle with your baby in skin to skin contact.  This helps to wake a sleepy baby and increases your milk supply.     Massage your breasts before nursing or pumping.  Practice relaxation techniques like visual imagery.    Increase the frequency of feedings and/or pumping sessions.    Most babies will feed 8-12 times in 24 hours with some periods of cluster feeding, therefore try to increase pumpings to 8-10 times every 24 hours.    Keep pumping log with 24 hour collection totals to monitor milk supply.  Once your milk is in (3-5 days post-delivery), pump until the sprays of milk slow to drops and for 1-2 minutes after to obtain the high fat milk.  This for most moms is 10-12 minutes, but pumping times may vary slightly.  A short pumping is better than no pumping!  If you have time you can “cluster pump” like a baby cluster feeds at times - pump for ten minutes, rest for ten  minutes, then repeat 2-3 times. Or try pumping every hour for 10 minutes for 2-3 hours.     Pumping should not be painful.   Use nipple cream on the base of your nipples prior to pumping.  Place breast flange on your breasts, centering your nipples.    Use correct size breasts flange for your nipple size. Nipple should not rub on inside of breast flange. If you need a different size breast flange contact your nurse or the lactation department.   Set pressure gauge to minimum and turn switch on.  Increase the suction to the most suction that is comfortable for you. Remember pumping should never be painful.  If breast milk flow is minimal, try increasing pressure gauge if it is comfortable to do so.  NOTE: Initially, in the colostrum phase amounts vary from a few drops to 30cc (1oz.).  If pumping is painful, turn the pump off, reposition breast shields, check that the size is correct for your nipple, and adjust the suction. If nipple pain continues contact the lactation department or your doctor.    Ways to help your milk let down (flow) to the pump:   Massage your breasts for a few minutes prior to pumping and massage again if the milk flow slows down during the pumping session.   Hand expression of milk before and after pumping may allow you to obtain more milk than the pump alone.   When milk flow slows, increasing pump speed back to 80 cpm (Ameda [place]) or switching pump back to “stimulation” phase to stimulate further milk ejection reflexes. Then decrease speed once milk begins to flow again.   Pump after you’ve seen, held, or touched your baby. Provide skin-to-skin when able.  Pump with baby close by or have a picture of your baby to look at while you pump  Inhale the scent of your baby from something your baby wore.  Sit back, close your eyes and imagine how sweet and soft your baby is; imagine “flowing things” like waterfalls, white rivers.  Listen to relaxing music. There are relaxation/meditation CD/tapes  made especially for mothers pumping their breast milk.  Have a nice tall glass of water, juice, or milk close by to quench your thirst.  View the [facility] website videos: Maximizing Milk Production and Hand Expression   [email or web address]/[email or web address]/MaxProduction.[email or web address] (Google search: Kevin maximizing milk supply)      Include night feedings and/or pumping sessions. Your hormone levels are higher at night.    Increasing milk flow to baby if breastfeeding by Improving your baby’s position and latch. (refer to additional instructions)            Additional recommendations can be made on an individual basis depending on needs.     [facility] has great support for our families even after discharge.  We have virtual or in-person support groups.  Visit our website for the most up-to-date info for our many different support groups. [email or web address]       Outpatient Lactation appoints.  Call [phone]- to schedule an appt.  Our office is located in the Maternal Fetal Medicine office next to [facility] on the first floor.      New Moms Support Groups  Our weekly New Mom Support Groups are for any new parents in our community. They are led by an experienced Mother/Baby nurse or IBCLC and usually include a guest speaker on a topic of interest to new parents. These in-person groups also include Breastfeeding Support at each meeting. Bring your baby ( - 6 months) with you! Moms-to-be are also welcome! All mom's welcome even if its not your first.     MOM & BABY HOUR   Meets most  10:00 - 11:30 a.m.  Masks are not required, but be considerate of others and do not attend if mom or baby have had any symptoms of illness within the previous 24 hours. Breastfeeding support will be included at each session--just ask the leader any breastfeeding questions you may have. Location [facility] [place] [address] [place]  Go inside the front door and to the right to the “Community Education Room”.    Mom's Line: [phone]   This service is provided by [facility], has a phone line dedicated women (or anyone worried about a women) who may be experiencing signs or symptoms of postpartum depression.    Nurturing Mom- A support group for new and expectant moms looking for support with the transition to parenthood as well as those experiencing symptoms of  anxiety and/or depression.  Please contact [email or web address] if you need directions or the link for the virtual meetings. Please contact [email or web address] if you plan to attend, but please be considerate of others and do not attend if mom or baby have had any symptoms of illness within the previous 24 hours.     La Leche League for breast feeding and parent support, Website: [employer] [employer]  and for the [place] group and other groups visit https://www.facebook.com/pg/Andrew/events/.  to help find a group, all meetings are virtual.     Facebook groups-  for more support when home- [facility] --- you can find mom-to-mom advice and the list of speaker topics for cradle talk program.     Helpful websites:    www.llli.org  www.[employer]  [email or web address].org

## 2024-05-29 NOTE — LACTATION NOTE
This note was copied from a baby's chart.     24 1400   Evaluation Type   Evaluation Type Outpatient Initial   Problems & Assessment   Problems Diagnosed or Identified Latch difficulty;Sleepy; feeding problem;Tongue restriction   Problems: comment/detail Ifrah presents with 15 day old Baljit for breastfeeding evaluation reporting the following: hx of dehydration requring hospital admission for IVF, s/p tongue tie release 6 days ago (ENT with exercises recommended/performed 4X/day for 4-6 weeks), breastfeeding has improved evident by Pre/Post weight visits at pediatric office, hx of nipple pain now improved/healed, Ifrah pumps 3-4 times daily meeting 3/5 of supplementation needs (volume ranges 0.5-2 oz using Spectra 1 pump. Appropriate flange size determined/recommended today.   Infant Assessment Anterior fontanel soft and flat;Abdomen soft, non-distended;Good skin turgor;Hunger cues present   Muscle tone Appropriate for GA   Feeding Assessment   Summary Current Feeding Breastfeeding with breast milk supplement;Breastfeeding with formula supplement   Last 24 hour feeding summary BF 9 + 6 bottles totalling 8 oz (3oz ABM, 5 oz EBM)   Breastfeeding Assessment Assisted with breastfeeding w/mother's permission;Calm and ready to breastfeed;Coordinated suck/swallow;Deep latch achieved and observed;Tolerated feeding well;Sustained nutrititive latch w/audible swallows;Sleepy infant, quickly pacifies   Breastfeeding lasted # of minutes 30   Breastfeeding Positions cross cradle;football;right breast;left breast   Latch 1   Audible Sucks/Swallows 2   Type of Nipple 2   Comfort (Breast/Nipple) 2   Hold (Positioning) 1   LATCH Score 8   Other (comment) Responded well to support, deep latch techniques, position recommendation, breast compressions, and gentle waking. Two tone lips post feeding, chomping on right side in CC hold, improved in  FB hold, fatigued quickly. Mother pumped 15 ml following infant transfer of  64 ml.   Output   # Voids in 24 hours WNL   # Stools in 24 hours WNL   # Emesis in 24 hours minimal   Pre/Post Weights   Pre-Weight Right Breast (g) 3620   Post-Weight Right Breast (g) 3636   ml of milk, RT Brst 16   Pre-Weight Left Breast (g) 3636   Post-Weight Left Breast (g) 3684   ml of milk, LT Brst 48   ml of milk, total 64   Supplement Type EBM   Supplement total, ml 15   Feeding total ml 79   Equipment used   Equipment used Bottle with slow flow nipple

## 2024-06-10 ENCOUNTER — LACTATION ENCOUNTER (OUTPATIENT)
Dept: LACTATION | Facility: HOSPITAL | Age: 29
End: 2024-06-10

## 2024-06-10 ENCOUNTER — NURSE ONLY (OUTPATIENT)
Dept: LACTATION | Facility: HOSPITAL | Age: 29
End: 2024-06-10
Attending: OBSTETRICS & GYNECOLOGY
Payer: COMMERCIAL

## 2024-06-10 PROCEDURE — 99213 OFFICE O/P EST LOW 20 MIN: CPT

## 2024-06-10 NOTE — LACTATION NOTE
This note was copied from a baby's chart.     06/10/24 1400   Evaluation Type   Evaluation Type Outpatient Follow Up   Problems & Assessment   Problems: comment/detail Ifrah presents with 27 day old Baljit for follow up breastfeeding evaluation reporting the following; breastfeeding with each feeding with reduced nipple pain 18 days post tongue tie procedure, continued need for supplementation, able to provide breastmilk for most supplemlents given. Current naked weight is 9 lb 2.8 oz, increase of 19 oz in 12 days.   Infant Assessment Anterior fontanel soft and flat;Hunger cues present;Good skin turgor;Skin color: pink or appropriate for ethnicity;Oral mucous membranes moist   Muscle tone Appropriate for GA   Feeding Assessment   Summary Current Feeding Breastfeeding with breast milk supplement;Breastfeeding with formula supplement   Last 24 hour feeding summary BF + 9 oz supplementation   Breastfeeding Assessment Assisted with breastfeeding w/mother's permission;Calm and ready to breastfeed;Coordinated suck/swallow;Sleepy infant, quickly pacifies;Tolerated feeding well;Sustained nutrititive latch w/audible swallows;PO supplement followed breastfeeding   Breastfeeding lasted # of minutes 30   Breastfeeding Positions cross cradle;right breast;left breast   Latch 1   Audible Sucks/Swallows 1   Type of Nipple 2   Comfort (Breast/Nipple) 2   Hold (Positioning) 1   LATCH Score 7   Other (comment) Evidence of seal breaks during breast and bottle feeding at this session, fatigues quickly, supplement followed. mother did not have her pump today, reports residual volumes 2-3 oz, was able to pump 5 oz most recently   Output   # Voids in 24 hours WNL   # Stools in 24 hours 2   # Emesis in 24 hours one/day   Pre/Post Weights   Pre-Weight Right Breast (g) 4162   Post-Weight Right Breast (g) 4190   ml of milk, RT Brst 28   Pre-Weight Left Breast (g) 4190   Post-Weight Left Breast (g) 4220   ml of milk, LT Brst 30   ml of milk,  total 58   Supplement Type EBM   Supplement total, ml 30   Feeding total ml 88   Equipment used   Equipment used Bottle with slow flow nipple

## 2024-06-10 NOTE — PATIENT INSTRUCTIONS
[facility]  Ashley Herrera RN, BSN, IBCLC  [phone]      Today's Naked Weight: 9 lb 2.8 oz   Weight increase: 19 oz in 12 days      Baljit transferred 58 ml from breast today (28 right, 30 left). Based on today's weight and feeding evaluation, Caits weight has stabilized with the need for supplementation by bottle following breastfeeding sessions, approximately 9 oz/day. Baljit continues to have difficulty with efficiency at breast post procedural, however, may have some feedings better than others. Based on reported challenges discussed today of frequent feedings at breast and need for frequent supplementation, a feeding structure was developed. Consider further evaluation with speech for additional support and suggestions for strengthening oral function.    FEEDING PLAN    Breastfeeding frequency: Continue to offer breast with each feeding,make the best of 20-30 minutes (+/-) when feeding at breast, apply breast compressions and provide gentle stimulation as needed to encourage efficiency at breast.    Supplementation: Offer 1 oz by bottle following every other breast feeding session during the day (3/day), Bedtime: offer breasts first topped with bottle (breast milk/formula) 1.5 oz. If Baljit is breastfeeding at night prior to supplement, try not pumping to allow for miranda breasts at late night breastfeeding sessions, unless your comfort is compromised. If hunger cues or cluster patterns persist, consider offering increase volumes. Consider the use of Dr. Jack standard bottle, Nipple 1.  See paced bottle feeding below if supplementation by bottle is indicated.    Pumping: During the day, pump following each supplement/bottle given. Night time: pump as needed for comfort, poor feeding, or if supplement is offered prior to breastfeeding session. Adjust pump settings: increase vacuum/suction to maximum level that is comfortably tolerated ~ adjust stimulation mode/expression  mode according to milk release.     Follow up: With Pediatrician as directed. Weight check in 2 weeks, with Pediatrician or lactation. Refer to additional support groups/resources below.          ADDITIONAL INFORMATION:     Snuggle your baby in skin to skin contact between and during feedings whenever possible.    Massage your breasts before nursing or pumping to soften areola if needed.    Breastfeed with hunger cues: Most babies will breastfeed 8-12 times every 24 hours with some clustered breastfeeding, especially during growth spurts.     Positioning:   Baby facing mom with mouth at nipple level. Bring infant to the breast not the breast to the infant.  Make sure infant is fully turned towards you with head aligned with the shoulders for latch and arms hugging you.  Baby should approach breast reaching up with the chin lifted.  Chin is deep into the breast and nose is slightly away from breast after latch.  Make small adjustments in position for your comfort and to help make space for the infant's nose if needed.    Deep Latching on:  Express drops of milk onto your baby’s lips to encourage latching if needed.  Aim your nipple to baby’s nose  Wait for a wide mouth and push your nipple in the mouth as you bring infant fully onto the breast.  Observe for mouth \"planted\" on the breast and for good jaw movement with suck.    Is baby taking enough breast milk?  Swallowing with most sucks (every 1-3 sucks) until satisfied at least 8-12 times every 24 hours.  Compressing the breast when your baby sucks can increase milk flow.  At least 6-8 wet diapers and at least 3-4 soft, yellow seedy stools every 24 hours. Use the breastfeeding journal to keep a record.   Weight gain of at least 5-7 ounces per week for the first 3 months after return to birth weight.    Supplement when:    Breastfeeding session does not meet adequate feeding guidelines above.  Your baby's doctor has advised that supplementation is needed.  Use your  expressed breast milk as the supplement or formula if breast milk does not meet volume infant requires.    Hour of Age  Intake (mL/feed)  1st 24 hours 2-10  24-48 hours 5-15  48-72 hours 15-30  72-96 hours 30-60  Day 10: 2-3 ounces per feeding.  4 weeks: 3-4 ounces or more per feeding.    Paced bottle feeding using a slow flow nipple:     Hold your baby in an upright position, supporting the hand and neck with your hand, rather than in the crook of your arm.   Let your baby “latch on” to the bottle: stroke nipple down from top lip to bottom, licking is good, wait for wide mouth and insert nipple with lips on base.  Angle the bottle so flow is slower. If the bottle is vertical milk will flow to quickly.  Pausing mimics breastfeeding and discourages “guzzling” the feeding.      Do I need to pump my breasts?  If supplementing:  Pump both breasts each time a supplement is given until infant nursing well.  Pump for 10-15 minutes using double electric breast pump.  Save all expressed breast milk for your infant.    Remember the helpful website to improve your production that helps [email or web address]/[email or web address]-education/breastfeeding/maximizing-milk-production.html    Breastfeeding Journal:  Write down your baby’s feedings and diapers - if not meeting the guideline for number of diapers or feedings, call your baby’s doctor.      Follow up with your OB healthcare provider:  Call if a plugged duct or engorgement persists greater than 48 hours. Call if firm or reddened spots are present in breast with signs of fever, chills or flu like symptoms (possible breast infection/mastitis). Check your temperature during engorgement.      Care for nipples until healed:     Express drops of breast milk on nipples before and after nursing (unless nipple thrush is suspected or present).  Use a hydrogel type dressing on your nipples between feedings. (Soothies or Ameda Comfort Gel pads)  Or, use Lanolin every time after  breastfeeding. (Do not combine with use of gel pads)  If too sore to nurse on one or both breasts, pump one (or both) breast(s) to comfort every 2-3 hours. If nursing to contentment on one breast, this pumped milk can be stored for future use. If not nursing on either breast, feed baby your breast milk until able to return to breast.   Discuss use of all purpose nipple ointment with your OB doctor.   Call doctor if nipple has signs of infection: red/deep pink, drainage (pus), increased pain, fever.         Call your OB doctor with any signs of   mastitis (breast infection)    Plugged area(s) are not soft within 24 hours.   Breast becomes firm, reddened, or painful.   Fever, chills, or flu-like symptoms. Check your temperature 3 times daily until a plugged duct or mastitis resolves.    If mastitis occurs:  Continue breastfeeding and/or pumping - your milk is not infected.   Continue above treatment for relieving plugged area(s)  Continue to take antibiotic as prescribed even though you may quickly feel better.   Contact your doctor is you are not feeling significantly better within 1-2 days of starting antibiotic, sooner if symptoms worsen.      Call the lactation consultants at [phone] as needed.         Increasing Milk Production Using a Breast Pump       Kangaroo mother care: Snuggle with your baby in skin to skin contact.  This helps to wake a sleepy baby and increases your milk supply.     Massage your breasts before nursing or pumping.  Practice relaxation techniques like visual imagery.    Increase the frequency of feedings and/or pumping sessions.    Most babies will feed 8-12 times in 24 hours with some periods of cluster feeding, therefore try to increase pumpings to 8-10 times every 24 hours.    Keep pumping log with 24 hour collection totals to monitor milk supply.  Once your milk is in (3-5 days post-delivery), pump until the sprays of milk slow to drops and for 1-2 minutes after to obtain the high  fat milk.  This for most moms is 10-12 minutes, but pumping times may vary slightly.  A short pumping is better than no pumping!  If you have time you can “cluster pump” like a baby cluster feeds at times - pump for ten minutes, rest for ten minutes, then repeat 2-3 times. Or try pumping every hour for 10 minutes for 2-3 hours.     Pumping should not be painful.   Use nipple cream on the base of your nipples prior to pumping.  Place breast flange on your breasts, centering your nipples.    Use correct size breasts flange for your nipple size. Nipple should not rub on inside of breast flange. If you need a different size breast flange contact your nurse or the lactation department.   Set pressure gauge to minimum and turn switch on.  Increase the suction to the most suction that is comfortable for you. Remember pumping should never be painful.  If breast milk flow is minimal, try increasing pressure gauge if it is comfortable to do so.  NOTE: Initially, in the colostrum phase amounts vary from a few drops to 30cc (1oz.).  If pumping is painful, turn the pump off, reposition breast shields, check that the size is correct for your nipple, and adjust the suction. If nipple pain continues contact the lactation department or your doctor.    Ways to help your milk let down (flow) to the pump:   Massage your breasts for a few minutes prior to pumping and massage again if the milk flow slows down during the pumping session.   Hand expression of milk before and after pumping may allow you to obtain more milk than the pump alone.   When milk flow slows, increasing pump speed back to 80 cpm (Ameda Kenaitze) or switching pump back to “stimulation” phase to stimulate further milk ejection reflexes. Then decrease speed once milk begins to flow again.   Pump after you’ve seen, held, or touched your baby. Provide skin-to-skin when able.  Pump with baby close by or have a picture of your baby to look at while you pump  Inhale the  scent of your baby from something your baby wore.  Sit back, close your eyes and imagine how sweet and soft your baby is; imagine “flowing things” like waterfalls, white rivers.  Listen to relaxing music. There are relaxation/meditation CD/tapes made especially for mothers pumping their breast milk.  Have a nice tall glass of water, juice, or milk close by to quench your thirst.  View the [facility] website videos: Maximizing Milk Production and Hand Expression   [email or web address] (Google search: Kevin maximizing milk supply)      Include night feedings and/or pumping sessions. Your hormone levels are higher at night.    Increasing milk flow to baby if breastfeeding by Improving your baby’s position and latch. (refer to additional instructions)            Additional recommendations can be made on an individual basis depending on needs.     [facility] has great support for our families even after discharge.  We have virtual or in-person support groups.  Visit our website for the most up-to-date info for our many different support groups. [email or web address]       Outpatient Lactation appoints.  Call [phone]- to schedule an appt.  Our office is located in the Maternal Fetal Medicine office next to [facility] on the first floor.      New Moms Support Groups  Our weekly New Mom Support Groups are for any new parents in our community. They are led by an experienced Mother/Baby nurse or IBCLC and usually include a guest speaker on a topic of interest to new parents. These in-person groups also include Breastfeeding Support at each meeting. Bring your baby ( - 6 months) with you! Moms-to-be are also welcome! All mom's welcome even if its not your first.     MOM & BABY HOUR   Meets most  10:00 - 11:30 a.m.  Masks are not required, but be considerate of others and do not attend if mom or baby have had  any symptoms of illness within the previous 24 hours. Breastfeeding support will be included at each session--just ask the leader any breastfeeding questions you may have. Location [facility] [place] [address] [place] Go inside the front door and to the right to the “Community Education Room”.    Mom's Line: [phone]   This service is provided by [facility], has a phone line dedicated women (or anyone worried about a women) who may be experiencing signs or symptoms of postpartum depression.    Nurturing Mom- A support group for new and expectant moms looking for support with the transition to parenthood as well as those experiencing symptoms of  anxiety and/or depression.  Please contact [email or web address] if you need directions or the link for the virtual meetings. Please contact [email or web address] if you plan to attend, but please be considerate of others and do not attend if mom or baby have had any symptoms of illness within the previous 24 hours.     La Leche League for breast feeding and parent support, Website: IIIus.org  and for the [place] group and other groups visit https://www.facebook.com/pg/[place]/events/.  to help find a group, all meetings are virtual.     Facebook groups-  for more support when home- Babies & Mommies of [facility] --- you can find mom-to-mom advice and the list of speaker topics for cradle talk program.     Helpful websites:    www.llli.org  [email or web address]  www.[email or web address]

## 2024-06-18 ENCOUNTER — TELEPHONE (OUTPATIENT)
Dept: OBGYN UNIT | Facility: HOSPITAL | Age: 29
End: 2024-06-18

## 2024-06-28 ENCOUNTER — LAB ENCOUNTER (OUTPATIENT)
Dept: LAB | Facility: HOSPITAL | Age: 29
End: 2024-06-28
Attending: OBSTETRICS & GYNECOLOGY
Payer: COMMERCIAL

## 2024-06-28 ENCOUNTER — POSTPARTUM (OUTPATIENT)
Dept: OBGYN CLINIC | Facility: CLINIC | Age: 29
End: 2024-06-28

## 2024-06-28 VITALS
BODY MASS INDEX: 22.02 KG/M2 | DIASTOLIC BLOOD PRESSURE: 76 MMHG | HEIGHT: 64 IN | SYSTOLIC BLOOD PRESSURE: 100 MMHG | WEIGHT: 129 LBS

## 2024-06-28 DIAGNOSIS — N81.89 PELVIC FLOOR WEAKNESS IN FEMALE: ICD-10-CM

## 2024-06-28 DIAGNOSIS — E03.9 HYPOTHYROIDISM, UNSPECIFIED TYPE: ICD-10-CM

## 2024-06-28 DIAGNOSIS — Z12.4 PAP SMEAR FOR CERVICAL CANCER SCREENING: ICD-10-CM

## 2024-06-28 LAB — TSI SER-ACNC: 0.01 MIU/ML (ref 0.55–4.78)

## 2024-06-28 PROCEDURE — 88175 CYTOPATH C/V AUTO FLUID REDO: CPT | Performed by: OBSTETRICS & GYNECOLOGY

## 2024-06-28 PROCEDURE — 36415 COLL VENOUS BLD VENIPUNCTURE: CPT

## 2024-06-28 PROCEDURE — 87624 HPV HI-RISK TYP POOLED RSLT: CPT | Performed by: OBSTETRICS & GYNECOLOGY

## 2024-06-28 PROCEDURE — 84443 ASSAY THYROID STIM HORMONE: CPT

## 2024-06-28 RX ORDER — NORGESTIMATE AND ETHINYL ESTRADIOL 7DAYSX3 LO
1 KIT ORAL DAILY
Qty: 84 TABLET | Refills: 3 | Status: SHIPPED | OUTPATIENT
Start: 2024-06-28 | End: 2025-06-28

## 2024-06-28 NOTE — PROGRESS NOTES
[facility]  Obstetrics and Gynecology   Postpartum Progress Note    Subjective:     Ifrah Perea is a 29 year old  female who is s/p  on 24. Her pregnancy was complicated by hypothyroidism. She reports doing well. Baby boy is doing well and breastfeeding. The patient reports vagina bleeding is very light. The patient denies emotional concerns.     Review of Systems:  General:  denies fevers, chills, fatigue and malaise.   Respiratory:  denies SOB, dyspnea, cough or wheezing  Cardiovascular:  denies chest pain, palpitations, exercise intolerance   GI: denies abdominal pain, diarrhea, constipation  :  denies dysuria, hematuria, increased urinary frequency. denies abnormal uterine bleeding or vaginal discharge.       Objective:     Vitals:    24 1139   Weight: 129 lb (58.5 kg)       Body mass index is 22.14 kg/m².    GENERAL: well developed, well nourished, in no apparent distress, alert and orientated X 3  PSYCH: mood and affect stable   SKIN: no rashes, no lesions  HEENT: normal  LUNGS: respiration unlabored  CARDIOVASCULAR: no peripheral edema or varicosities, skin warm and dry  ABDOMEN: Soft, non distended; non tender, no masses  GYNE/:   External Genitalia: normal, no lesions, good perineal support  Urethra: meatus normal   Bladder: well supported  Vagina: normal mucosa, no lesions, discharge absent, strength 3/5 but cannot hold  Uterus: normal size, mobile, nontender  Cervix: normal os, no lesions or bleeding  Adnexa:normal size, bilaterally nontender, no palpable masses  Cul-de-sac: normal  R/V: normal perineum, no hemorrhoids  EXTREMITIES:  nontender without edema        Assessment:     Ifrah Perea is a 29 year old  female who presents for postpartum visit   Patient Active Problem List   Diagnosis    Hypothyroidism due to Hashimoto's thyroiditis    Multinodular thyroid    Carrier of nephrotic syndrome - partner negative    Negative prequel   pregnancy    Vaginal discharge    Pregnancy (HCC)     (normal spontaneous vaginal delivery) ([facility])    Postpartum care and examination of lactating mother ([facility])         Plan:     Postpartum exam   - doing well  - no abnormal findings on physical exam   - may return to normal activity   - pap collected today.  - recommend pelvic floor PT  - recheck TSH today.    Contraception counseling   - discussion held with patient about family planning and contraception  - pt desires OCPs - rx sent.    All of the findings and plan were discussed with the patient.  She notes understanding and agrees with the plan of care.  All questions were answered to the best of my ability at this time.    RTC in  1 year for well woman exam or sooner if needed     Luisa Mcintyre, DO

## 2024-07-01 LAB — HPV E6+E7 MRNA CVX QL NAA+PROBE: NEGATIVE

## 2024-07-29 ENCOUNTER — TELEPHONE (OUTPATIENT)
Dept: PHYSICAL THERAPY | Facility: HOSPITAL | Age: 29
End: 2024-07-29

## 2024-08-01 ENCOUNTER — OFFICE VISIT (OUTPATIENT)
Dept: PHYSICAL THERAPY | Age: 29
End: 2024-08-01
Attending: OBSTETRICS & GYNECOLOGY
Payer: COMMERCIAL

## 2024-08-01 DIAGNOSIS — N81.89 PELVIC FLOOR WEAKNESS IN FEMALE: ICD-10-CM

## 2024-08-01 PROCEDURE — 97530 THERAPEUTIC ACTIVITIES: CPT

## 2024-08-01 PROCEDURE — 97161 PT EVAL LOW COMPLEX 20 MIN: CPT

## 2024-08-01 NOTE — PROGRESS NOTES
PELVIC FLOOR EVALUATION:   Referring Provider: Miguelina  Diagnosis: Pelvic floor weakness in female (N81.89)  LBP  Date of onset: 24    Precautions:   Breastfeeding Date of Service: 2024      PATIENT SUMMARY   Ifrah Perea is a 29 year old female G1P!,  24.  She presents to therapy today with complaints of urinary leakage with cough or sneeze.  She started having this at the end of her 3rd trimester of pregnancy.  She also feels weakness especially of her core.  She also c/o LBP especially worse in the past week. She also had LBP during pregnancy.  Back pain is limiting her walking tolerance and she has increased pain with bending and lifting.  She gets some relief when she lies down and heating pad has also helped.     Current symptoms include: back pain, vaginal pain, and leakage  Pt describes pain level: current 0/10, best 0/10, worst 6/10.   Quality: sharp and aching    PMH: Past medical history was reviewed with Ifrah. Significant findings include Hypothyroidism during pregnancy    PSH: Past surgical history was reviewed with Ifrah. Significant findings include n/a    Related surgical history:n/a     PLOF: Ifrah describes prior level of function LEAH at end of 3rd trimester.   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  Works out regularly- walking and weight training.    Patient GOALS: Pt goals include Strengthen her pelvic floor to prevent future problems.    Obstetrical/Gynecological history:  Pregnant Now: No  Gravida1/Para1  Complications during pregnancy/delivery: Hypothyroidism during pregnancy  Last menstrual period: prior to pregnancy  Birth Control Method:      URINARY HABITS  Types of symptoms: stress incontinence  Events associated with the onset of urinary complaints: coughing/sneezing especially if standing  Urodynamic Testing completed: no  Abdominal/Vaginal Pressure complaints: no  Urinary Frequency: every 2 hours  Urine Stream:  WNL  Amount: WNL     Leaking occurs: yes  Episodes of Leakage: 1 times per day  Amount of leakage: small  Pad use: no  Pad Change frequency: n/a  Nocturia: 1x/night when up with baby  Fluid Intake:   Bladder irritants: 1 coffee   Urine Stop test: yes  Post void dribble: no  Hovering: yes  Empty bladder just in case: no  Do you ever leak urine without knowing it? no     BOWEL HABITS  Types of symptoms: none   Frequency of bowel movements: 1x/day to every other day  Stool consistency: [place] Stool Scale: 2  Do you strain with defecation: No   Laxative/Stool softener use: No  Taking fiber supplement: No     SEXUAL HEALTH STATUS  Sexual [place] Status: yes  Pain with initial and/or deep penetration: discomfort  Pain with pelvic exam/tampon use: no       ASSESSMENT  Ifrah presents to physical therapy evaluation with primary c/o pelvic floor and core weakness, LBP and urinary leakage. The results of the objective tests and measures show decreased lumbar AROM, tenderness at L5 and PSIS bilat, mild diastasis recti superior to umbilicus; pelvic floor muscle strength and tenderness to be assess at next visit.  Functional deficits include but are not limited to urinary leakage with cough and sneeze, bending and lifting, discomfort with intercourse.  Signs and symptoms are consistent with diagnosis of pelvic floor weakness and LBP. Pt and PT discussed evaluation findings, pathology and POC. Pt voiced understanding. Skilled Pelvic Physical Therapy is medically necessary to address the above impairments and reach functional goals.     OBJECTIVE:   PFDI 20    Scores   POPDI 6:    4.17   CRAD 8:    28.13   ROBERT 6:    54.17   Summary:    86.47        Range Of Motion  Lumbar AROM screen: Flex mod decr with most movement at hips; Ext WNL mild pain; Side bending Bilat mild decrease with c/o tightness  LE AROM screen: grossly WNL     Flexibility Summary: WNL ANDREA LE      Strength (MMT) 5/5 ANDREA LE      Palpation: Tenderness at  L5 spinous process, bilat PSIS and bilat gluteus medius  Special Tests  SLR neg  Pelvic alignment neutral      Functional screen:  Double leg squat: Good hip/knee mechanics  Single leg squat: Mild hip adduction bilat     Informed consent for internal pelvic evaluation given: Yes  To be completed at next visit due to time.     Diastasis Recti:   Above umbilicus: 1 cm width; 1.5 cm depth  Below umbilicus: .5cm width; 0 depth    Rib flare/gripping present: No     Today's Treatment and Response:   Patient education was provided on objective findings of external and internal evaluation and expectations with treatment outcomes.   Theract: (10 min) Educated on pelvic anatomy and function with diagrams and pelvic model and adequate hydration levels, sitting posture and desk ergonomics, squatting and hip hinging for ADLs.    HEP: Patient was instructed in and issued a HEP for: Increase water intake; Read Postpartum Posture and Body Mechanics Tips     Charges: PT Eval Low Complexity, 1 TA      Total Timed Treatment: 10 min     Total Treatment Time: 45 min      Based on clinical rationale and outcome measures, this evaluation involved Low Complexity decision making due to 1-2 personal factors/comorbidities, <3 body structures involved/activity limitations, and evolving symptoms including stress urinary incontinence and pelvic pain  PLAN OF CARE:    Goals: (to be met in 10 visits)  Patient instructed on bladder irritants, increased water intake to 64 ounces /day     Patient reports a reduction in LEAH from 1-2x/ day to 0x/ day resulting in no leakage into underwear.    Patient is able to perform Levator Ani contraction inverse to diaphragmatic breathing to allow for pelvic brace with ADLs without valsalva.     Patient to demonstrate proper body mechanics for lifting to decrease her LBP with lifting her son in his car seat or from the floor.       Patient reports change in Western stool #2 to #4 with daily bowel moment without  straining and prevention of further pelvic organ prolapse.       Patient understands importance of performing HEP to prevent reoccurrence of symptoms.    *Pelvic floor muscle strength goal to be made at next visit.     Frequency / Duration: Patient will be seen for 1 x/week or a total of 10 visits over a 90 day period.  Treatment will include: Manual Therapy, Neuromuscular Re-education, Self-Care Home Management, Therapeutic Activities, Therapeutic Exercise, and Home Exercise Program instruction      Education or treatment limitation: None  Rehab Potential:good        Patient/Family/Caregiver was advised of these findings, precautions, and treatment options and has agreed to actively participate in planning and for this course of care.     Thank you for your referral. Please co-sign or sign and return this letter via fax as soon as possible to [phone]. If you have any questions, please contact me at Dept: [phone]     Sincerely,  Electronically signed by therapist: Jeni Pederson PT  Physician's certification required: Yes  I certify the need for these services furnished under this plan of treatment and while under my care.     X___________________________________________________ Date____________________     Certification From: 8/1/2024  To:10/30/2024

## 2024-08-15 ENCOUNTER — OFFICE VISIT (OUTPATIENT)
Dept: PHYSICAL THERAPY | Age: 29
End: 2024-08-15
Attending: OBSTETRICS & GYNECOLOGY
Payer: COMMERCIAL

## 2024-08-15 PROCEDURE — 97112 NEUROMUSCULAR REEDUCATION: CPT

## 2024-08-15 PROCEDURE — 97110 THERAPEUTIC EXERCISES: CPT

## 2024-08-15 NOTE — PROGRESS NOTES
Diagnosis:   Postpartum exam (HCC) (Z39.2)  Pelvic floor weakness in female (N81.89)         Referring Provider: Luisa Mcintyre  Date of Evaluation:    8/1/24    Precautions:  Breastfeeding Next MD visit:   none scheduled  Date of Surgery: n/a   Insurance Primary/Secondary: [employer] / N/A     # Auth Visits: no limit, no auth            Subjective: Pt states that her low back was pretty bad on Tuesday, so she took ibuprofen which didn't seem to help. Pain was up to 8/10.  She states that she started working out at the gym on Sunday: weights under 10 pounds and walking on treadmill on incline.  She has returned to work and is sitting a lot more.      Pain: 0/10 current- more stiffness today.      Objective: Tx: See daily treatment log below  8/15/24:  SIJ provocation tests neg  Palpation: no tenderness at lumbar paraspinals      Assessment: Focused today on LBP due to increased pain in the past week.  She has had only small amounts of urinary leakage, so internal pelvic assessment will be held until next visit.  Pt demonstrated good understanding of deep breathing but did need verbal cues for correct sequencing of breath with activities.        Goals:   (to be met in 10 visits)  Patient instructed on bladder irritants, increased water intake to 64 ounces /day     Patient reports a reduction in LEAH from 1-2x/ day to 0x/ day resulting in no leakage into underwear.     Patient is able to perform Levator Ani contraction inverse to diaphragmatic breathing to allow for pelvic brace with ADLs without valsalva.      Patient to demonstrate proper body mechanics for lifting to decrease her LBP with lifting her son in his car seat or from the floor.       Patient reports change in [place] stool #2 to #4 with daily bowel moment without straining and prevention of further pelvic organ prolapse.       Patient understands importance of performing HEP to prevent reoccurrence of symptoms.    Plan: Continue core  stabilization with incorporation of diaphragmatic breathing.  Internal assessment of PFM next visit.  Date: 8/15/2024  TX#: 2/10 Date:                 TX#: 3/ Date:                 TX#: 4/ Date:                 TX#: 5/ Date:   Tx#: 6/   NMR: 35 min  Pt education regarding core 4 muscles  Instructions for TA, multifidi and PFM contractions in supine wiht 5\" hold  Instructions and practice of diaphragmatic breathing  Supine hip adduction with ball with deep breathing 10x  Bridge with ball with deep breathing 10x         Ther Ex: 10 min  LTR stretch 5\" 10x R/L  Post pelvic tilts 10x  S/L clams 20x R/L                     HEP: 8/1/24: Increase water intake; Read Postpartum Posture and Body Mechanics Tips  8/15/24: added diaphragmatic breathing, supine hip adduction isometric, bridge with ball, side lying clams    Charges: 2 NMR, 1 TE       Total Timed Treatment: 45 min  Total Treatment Time: 45 min

## 2024-08-18 ENCOUNTER — MOBILE ENCOUNTER (OUTPATIENT)
Dept: OBGYN CLINIC | Facility: CLINIC | Age: 29
End: 2024-08-18

## 2024-08-18 NOTE — PROGRESS NOTES
Telephone call:    Patient paged due to low back pain cramping, and pain underneath her ribs as well.  Patient noted that she experienced similar symptoms last month but then did not get her menstrual cycle and was expecting the menstrual cycle to come with the symptoms this month but it has not.  Patient has tried taking Motrin with little relief of pain.  Patient denies any urinary complaints.  Patient noted normal bowel movements and passing flatus.  Patient denies any fevers or chills.  Patient was informed to take a pregnancy test and if positive present to the emergency department.  Patient was also informed to rotate ibuprofen and Tylenol and if not improving pain then present to the emergency department.    She was informed to call the office first thing in the morning on Monday for an appointment within a week or 2.    Dr. Zechariah De Los Santos MD  [facility]

## 2024-08-20 ENCOUNTER — TELEPHONE (OUTPATIENT)
Dept: OBGYN CLINIC | Facility: CLINIC | Age: 29
End: 2024-08-20

## 2024-08-20 NOTE — TELEPHONE ENCOUNTER
RN spoke with pt. Pt says that her cramping and pelvic pain are preventing her from spending time with her baby. RN resked her appt. New appt is 8/22 at 2:00 with Dr. Mcintyre in [place]. Pt verbalized understanding and agreed with plan of care.

## 2024-08-20 NOTE — TELEPHONE ENCOUNTER
The patient is calling to see if she can have a sooner appointment. She is scheduled on 8/28/24 but she is still having a lot of pain and cramping. Her normal provider is Dr. Mcintyre.

## 2024-08-22 ENCOUNTER — OFFICE VISIT (OUTPATIENT)
Dept: OBGYN CLINIC | Facility: CLINIC | Age: 29
End: 2024-08-22
Payer: COMMERCIAL

## 2024-08-22 ENCOUNTER — APPOINTMENT (OUTPATIENT)
Dept: PHYSICAL THERAPY | Age: 29
End: 2024-08-22
Attending: OBSTETRICS & GYNECOLOGY
Payer: COMMERCIAL

## 2024-08-22 VITALS
SYSTOLIC BLOOD PRESSURE: 108 MMHG | WEIGHT: 128 LBS | DIASTOLIC BLOOD PRESSURE: 72 MMHG | BODY MASS INDEX: 21.85 KG/M2 | HEIGHT: 64 IN

## 2024-08-22 DIAGNOSIS — M54.50 ACUTE MIDLINE LOW BACK PAIN WITHOUT SCIATICA: Primary | ICD-10-CM

## 2024-08-22 PROCEDURE — 99214 OFFICE O/P EST MOD 30 MIN: CPT | Performed by: OBSTETRICS & GYNECOLOGY

## 2024-08-22 RX ORDER — CYCLOBENZAPRINE HCL 5 MG
5 TABLET ORAL 2 TIMES DAILY PRN
Qty: 30 TABLET | Refills: 3 | Status: SHIPPED | OUTPATIENT
Start: 2024-08-22 | End: 2024-08-26

## 2024-08-22 NOTE — PROGRESS NOTES
RETURN GYN OFFICE VISIT  [facility]    CHIEF COMPLAINT:    Chief Complaint   Patient presents with    Pain     Pt states a month started having back pain and then radiates through her back and then some cramping, thought it was menstrual, and it wasn't, getting it more frequently, taking ibuprofen and advil, pt states its making it hard for her to hold baby. Pt states she called and spoke to Dr. De Los Santos and negative pregnancy test at home        HISTORY OF PRESENT ILLNESS:    KENNEDI is a 29 year old female  here for   Back pain / pelvic pain    Strong, achy - stiff / achy. More than muscles soreness.  Can't get comfortable sitting / standing or even laying down.    Some days are fine like today    Other days, the pain comes on all the sudden and is stuck in bed, hard to function    Regular BM - no straining, no constipation  No pain with urination.    Not much sex, once since this pain started, it was not comfortable, felt very tight.    Only pain in back before was early pregnancy and end f third trimester.    REVIEW OF SYSTEMS:   CONSTITUTIONAL:  negative for fevers, chills, sweats and fatigue  GASTROINTESTINAL:  negative for nausea, vomiting, blood in stool, constipation, diarrhea and abdominal pain  GENITOURINARY: negative for no dysuria, urgency or frequency; no urinary incontinence; no hematuria  SKIN:  negative for  rash, skin lesion and pruritus  ENDOCRINE:  negative for acne, fatigue, weight gain and weight loss  BEHAVIOR/PSYCH:  negative for depressed mood, anhedonia and anxiety    CURRENT MEDICATIONS:      Current Outpatient Medications:     cyclobenzaprine 5 MG Oral Tab, Take 1 tablet (5 mg total) by mouth 2 (two) times daily as needed for Muscle spasms., Disp: 30 tablet, Rfl: 3    ibuprofen 600 MG Oral Tab, Take 1 tablet (600 mg total) by mouth every 6 (six) hours as needed for Pain., Disp: 60 tablet, Rfl: 0    levothyroxine 88 MCG Oral Tab, Take 1 tablet (88 mcg total) by mouth before  breakfast., Disp: 84 tablet, Rfl: 3    Prenatal MV-Min-Fe Fum-FA-DHA (PRENATAL 1 OR), Take by mouth., Disp: , Rfl:     Cholecalciferol (VITAMIN D) 50 MCG (2000 UT) Oral Cap, Take by mouth., Disp: , Rfl:     Norgestim-Eth Estrad Triphasic (TRI-LO-GERALDO) 0.18/0.215/0.25 MG-25 MCG Oral Tab, Take 1 tablet by mouth daily. (Patient not taking: Reported on 8/22/2024), Disp: 84 tablet, Rfl: 3    acetaminophen 325 MG Oral Tab, Take 1 tablet (325 mg total) by mouth every 6 (six) hours as needed for Pain. (Patient not taking: Reported on 6/28/2024), Disp: 60 tablet, Rfl: 0    Calcium Carbonate 600 MG Oral Tab, Take 1 tablet (600 mg total) by mouth. (Patient not taking: Reported on 6/28/2024), Disp: , Rfl:     PAST MEDICAL, SOCIAL AND FAMILY HISTORY:    Past Medical History:    Acne    Hypothyroidism     Past Surgical History:   Procedure Laterality Date    Patient denies any surgical history       Family History   Problem Relation Age of Onset    Thyroid Disorder Father     Hypertension Father     Thyroid Disorder Mother     Osteoporosis Maternal Grandmother     Obesity Maternal Grandfather     No Known Problems Paternal Grandmother     Cancer Paternal Grandfather     Other (lymphoma) Paternal Grandfather     No Known Problems Sister      Social History     Socioeconomic History    Marital status:    Tobacco Use    Smoking status: Never     Passive exposure: Never    Smokeless tobacco: Never   Vaping Use    Vaping status: Never Used   Substance and Sexual Activity    Alcohol use: Not Currently     Comment: socially on weekends    Drug use: Never    Sexual activity: Yes   Other Topics Concern    Blood Transfusions No    Caffeine Concern No    Stress Concern No    Weight Concern No    Special Diet No    Exercise No    Seat Belt No           PHYSICAL EXAM:   Patient's last menstrual period was 08/02/2023 (exact date).; Body mass index is 21.97 kg/m².      CONSTITUTIONAL:  Awake, alert, cooperative, no apparent  distress  EYES: sclera clear and conjunctiva normal  GASTROINTESTINAL:  soft, non-distended, non-tender, no masses palpated  MUSCULOSKELETAL: no tenseness or spasms, or point tenderness along spine and paraspinal muscles.  GENITAL/URINARY:    External Genitalia:  General appearance; normal, Hair distribution; normal, Lesions absent   Urethral Meatus:  Lesions absent, Prolapse absent  Bladder:  Tenderness absent, Cystocele absent  Vagina:  Discharge absent, Lesions absent, Pelvic support normal  Cervix:  Lesions absent, Discharge absent, Tenderness absent  Uterus:  Size normal, Masses absent, Tenderness absent  Adnexa:  Masses absent, Tenderness absent  Anus/Perineum:  Lesions absent  SKIN:  No rashes  PSYCH:  Affect Normal      ASSESSMENT AND PLAN:  1. Acute midline low back pain without sciatica  - no obvious pelvic pathology, US to eval.  - suspect muscle spasm in mid-back. Recommend continued PT, rx sent for flexeril (ok with breastfeeding.)  - Pelvic US Complete [facility] Only [[id number]]; Future        Medications    cyclobenzaprine 5 MG Oral Tab         Luisa Mcintyre, DO

## 2024-08-26 ENCOUNTER — OFFICE VISIT (OUTPATIENT)
Dept: FAMILY MEDICINE CLINIC | Facility: CLINIC | Age: 29
End: 2024-08-26

## 2024-08-26 VITALS
HEIGHT: 64 IN | TEMPERATURE: 98 F | HEART RATE: 72 BPM | DIASTOLIC BLOOD PRESSURE: 79 MMHG | SYSTOLIC BLOOD PRESSURE: 112 MMHG | OXYGEN SATURATION: 99 % | WEIGHT: 127.81 LBS | BODY MASS INDEX: 21.82 KG/M2

## 2024-08-26 DIAGNOSIS — Z00.00 WELL ADULT EXAM: Primary | ICD-10-CM

## 2024-08-26 DIAGNOSIS — M54.50 BILATERAL LOW BACK PAIN WITHOUT SCIATICA, UNSPECIFIED CHRONICITY: ICD-10-CM

## 2024-08-26 DIAGNOSIS — E04.1 THYROID NODULE: ICD-10-CM

## 2024-08-26 PROCEDURE — 99395 PREV VISIT EST AGE 18-39: CPT | Performed by: STUDENT IN AN ORGANIZED HEALTH CARE EDUCATION/TRAINING PROGRAM

## 2024-08-26 NOTE — PROGRESS NOTES
HPI:    Patient ID: Ifrah Perea is a 29 year old female.    HPI  Pt presenting for routine physical exam. Denies any acute issues or recent illnesses. No significant chronic medical problems. Past medical/surgical history, family history, and social history were reviewed.     H/o hypothyroidism  Prior thyroid nodule on Dec 2022 US, no recent surveillance  Feeling well overall    S/p  May 2024  Breastfeeding son  Reports ongoing back pain following pregnancy  Occasional associated nausea, headaches  Tylenol, Ibuprofen PRN  Started PFPT a few weeks ago  Transitioning to PT    Mood stable, denies SH/SI/HI    Review of Systems   A comprehensive 10 point review of systems was completed.  Pertinent positives and negatives noted in the the HPI.       Current Outpatient Medications   Medication Sig Dispense Refill    levothyroxine 88 MCG Oral Tab Take 1 tablet (88 mcg total) by mouth before breakfast. 84 tablet 3     Allergies:  Allergies   Allergen Reactions    Sulfa Antibiotics HIVES      Vitals:    24 1354   BP: 112/79   Pulse: 72   Temp: 98.4 °F (36.9 °C)   TempSrc: Temporal   SpO2: 99%   Weight: 127 lb 12.8 oz (58 kg)   Height: 5' 4\" (1.626 m)       Body mass index is 21.94 kg/m².   PHYSICAL EXAM:   Physical Exam  Vitals reviewed.   Constitutional:       General: She is not in acute distress.     Appearance: Normal appearance. She is well-developed.   HENT:      Head: Normocephalic and atraumatic.      Right Ear: Tympanic membrane, ear canal and external ear normal.      Left Ear: Tympanic membrane, ear canal and external ear normal.   Eyes:      Conjunctiva/sclera: Conjunctivae normal.   Neck:      Thyroid: No thyroid mass or thyroid tenderness.   Cardiovascular:      Rate and Rhythm: Normal rate and regular rhythm.      Pulses: Normal pulses.      Heart sounds: Normal heart sounds, S1 normal and S2 normal. No murmur heard.  Pulmonary:      Effort: Pulmonary effort is normal. No respiratory distress.       Breath sounds: Normal breath sounds. No wheezing, rhonchi or rales.   Abdominal:      General: Bowel sounds are normal.      Palpations: Abdomen is soft.      Tenderness: There is no abdominal tenderness. There is no guarding or rebound.   Musculoskeletal:      Cervical back: Normal range of motion and neck supple. No muscular tenderness.      Lumbar back: Tenderness present.      Right lower leg: No edema.      Left lower leg: No edema.   Lymphadenopathy:      Cervical: No cervical adenopathy.   Skin:     General: Skin is warm and dry.      Coloration: Skin is not jaundiced.   Neurological:      General: No focal deficit present.      Mental Status: She is alert and oriented to person, place, and time. Mental status is at baseline.   Psychiatric:         Attention and Perception: Attention normal.         Mood and Affect: Mood normal.         Behavior: Behavior normal. Behavior is cooperative.         Cognition and Memory: Cognition normal.                 ASSESSMENT/PLAN:   1. Well adult exam  Discussed preventative screenings  - Pap 6/2024  - will check labs as below  - encouraged to continue diet/exercise for overall wellness  - follow-up with eye doctor annually  - follow-up with dentist every 6 months  - return yearly for physicals  - annual flu shot  - tetanus booster every 10yrs  - TSH W Reflex To Free T4; Future  - CBC, Platelet; No Differential; Future  - Comp Metabolic Panel (14); Future  - Hemoglobin A1C; Future  - Lipid Panel; Future  - Vitamin D; Future    2. Thyroid nodule  Will check surveillance US  - US THYROID (CPT=76536); Future    3. Bilateral low back pain without sciatica, unspecified chronicity  - provided with gentle stretching/strengthening exercises  - encouraged to increase hydration and rest as able  - Tylenol/NSAIDs as needed, heat application  - PT for continued management  - to call with any questions or concerns    Pt verbalized understanding and agrees with plan.      Orders Placed  This Encounter   Procedures    TSH W Reflex To Free T4    CBC, Platelet; No Differential    Comp Metabolic Panel (14)    Hemoglobin A1C    Lipid Panel    Vitamin D       Meds This Visit:  Requested Prescriptions      No prescriptions requested or ordered in this encounter       Imaging & Referrals:  US THYROID (CPT=76536)         ID#[id number]

## 2024-08-29 ENCOUNTER — OFFICE VISIT (OUTPATIENT)
Dept: PHYSICAL THERAPY | Age: 29
End: 2024-08-29
Attending: OBSTETRICS & GYNECOLOGY
Payer: COMMERCIAL

## 2024-08-29 PROCEDURE — 97530 THERAPEUTIC ACTIVITIES: CPT

## 2024-08-29 PROCEDURE — 97110 THERAPEUTIC EXERCISES: CPT

## 2024-08-29 PROCEDURE — 97140 MANUAL THERAPY 1/> REGIONS: CPT

## 2024-08-29 NOTE — PROGRESS NOTES
Diagnosis:   Postpartum exam (HCC) (Z39.2)  Pelvic floor weakness in female (N81.89)         Referring Provider: Luisa Mcintyre  Date of Evaluation:    8/1/24    Precautions:  Breastfeeding Next MD visit:   none scheduled  Date of Surgery: n/a   Insurance Primary/Secondary: [employer] / N/A     # Auth Visits: no limit, no auth            Subjective: Pt states that her back pain was getting worst.  She saw  last week and she is going to have an ultrasound to rule out cyst.  Today, her back pain is okay, but last night was worse.  She was prescribed a muscle relaxer but has not taken it yet.      Pain: 0/10 current, tightness and tenderness.        Objective: Tx: See daily treatment log below  8/29/24:  Tenderness and adhesions bilat lumbar paraspinals and QL L>R  Informed verbal consent given for internal pelvic floor assessment and treatment: yes  Internal Examination   Mons pubis: WNL  Labia majora: WNL  Labia minora: WNL  Urethral meatus: WNL  Introitus: WNL  Perineal body: WNL     Internal Palpation Left Right Comments   Superficial Transverse perineal WNL WNL     Bulbocavernosus WNL WNL     Ischiocavernosus WNL WNL     Deep Transverse Perineal tenderness WNL     Compress Urethrae/Spinc. Urethrovaginalis    not tested Not tested     Pubococcygeus/Pubovaginalis minimal restriction minimal restriction and tenderness     Iliococcygeus minimal restriction minimal restriction and tenderness     Coccygeus not tested not tested     Piriformis not tested not tested     Obturator internus WNL WNL        Pelvic Floor Muscle strength: (PERF= Power/Endurance/Reps/Fast) MMT: 2+/2/3/NT    Accessory Muscle Use: none       Assessment:  Pt had tightness and tenderness especially at left quadratus lumborum so performed stretches for this area today and pt felt good with these. These stretches were added to her HEP.  Internal pelvic floor muscle assessment was completed today and pt does have mild tenderness and  tightness of levator ani especially on right side.  She did not have increased LBP with internal palpation.  Pt is also weak in PFM but will focus on relaxation for pelvic pain relief first and progress to pelvic floor strengthening when tolerated.      Goals:   (to be met in 10 visits)  Patient instructed on bladder irritants, increased water intake to 64 ounces /day     Patient reports a reduction in LEAH from 1-2x/ day to 0x/ day resulting in no leakage into underwear.     Patient is able to perform Levator Ani contraction inverse to diaphragmatic breathing to allow for pelvic brace with ADLs without valsalva.      Patient to demonstrate proper body mechanics for lifting to decrease her LBP with lifting her son in his car seat or from the floor.       Patient reports change in [place] stool #2 to #4 with daily bowel moment without straining and prevention of further pelvic organ prolapse.       Patient understands importance of performing HEP to prevent reoccurrence of symptoms.    Plan: Continue core stabilization with incorporation of diaphragmatic breathing with emphasis on PFM relaxation.  Manual treatment as needed.  Date: 8/15/2024  TX#: 2/10 Date:   8/29/24              TX#: 3/10 Date:                 TX#: 4/ Date:                 TX#: 5/ Date:   Tx#: 6/   NMR: 35 min  Pt education regarding core 4 muscles  Instructions for TA, multifidi and PFM contractions in supine wiht 5\" hold  Instructions and practice of diaphragmatic breathing  Supine hip adduction with ball with deep breathing 10x  Bridge with ball with deep breathing 10x   TA: 15 min  Internal pelvic floor muscles assessment performed and discussed findings and POC  PFM contractions with internal palpation  Reviewed importance of deep breathing with focus on pelvic floor muscle relaxation on inhale      Ther Ex: 10 min  LTR stretch 5\" 10x R/L  Post pelvic tilts 10x  S/L clams 20x R/L Ther Ex: 15 min  Quadruped cat/cow 10x  Child's pose with deep  breathing 1 min  Child's pose with side bending 30\" R/L  Left  QL stretch in right side lying over pillow with deep breathing x 1 min       Man Tx: 10 min  STM to bilat lumbar paraspinals and QL  MFR to left QL in side lying             HEP: 8/1/24: Increase water intake; Read Postpartum Posture and Body Mechanics Tips  8/15/24: added diaphragmatic breathing, supine hip adduction isometric, bridge with ball, side lying clams  8/29/24: cat/cow, child's pose, child's pose with side bending    Charges: 1 TA, 1 TE, 1 Man       Total Timed Treatment: 40 min  Total Treatment Time: 40 min

## 2024-08-30 ENCOUNTER — LAB ENCOUNTER (OUTPATIENT)
Dept: LAB | Age: 29
End: 2024-08-30
Attending: STUDENT IN AN ORGANIZED HEALTH CARE EDUCATION/TRAINING PROGRAM
Payer: COMMERCIAL

## 2024-08-30 ENCOUNTER — ULTRASOUND ENCOUNTER (OUTPATIENT)
Dept: OBGYN CLINIC | Facility: CLINIC | Age: 29
End: 2024-08-30
Payer: COMMERCIAL

## 2024-08-30 DIAGNOSIS — Z00.00 WELL ADULT EXAM: ICD-10-CM

## 2024-08-30 DIAGNOSIS — M54.50 ACUTE MIDLINE LOW BACK PAIN WITHOUT SCIATICA: ICD-10-CM

## 2024-08-30 LAB
ALBUMIN SERPL-MCNC: 4.7 G/DL (ref 3.2–4.8)
ALBUMIN/GLOB SERPL: 1.8 {RATIO} (ref 1–2)
ALP LIVER SERPL-CCNC: 71 U/L
ALT SERPL-CCNC: 22 U/L
ANION GAP SERPL CALC-SCNC: 4 MMOL/L (ref 0–18)
AST SERPL-CCNC: 20 U/L (ref ?–34)
BILIRUB SERPL-MCNC: 0.8 MG/DL (ref 0.3–1.2)
BUN BLD-MCNC: 16 MG/DL (ref 9–23)
BUN/CREAT SERPL: 20.8 (ref 10–20)
CALCIUM BLD-MCNC: 10.2 MG/DL (ref 8.7–10.4)
CHLORIDE SERPL-SCNC: 108 MMOL/L (ref 98–112)
CHOLEST SERPL-MCNC: 191 MG/DL (ref ?–200)
CO2 SERPL-SCNC: 28 MMOL/L (ref 21–32)
CREAT BLD-MCNC: 0.77 MG/DL
DEPRECATED RDW RBC AUTO: 36.5 FL (ref 35.1–46.3)
EGFRCR SERPLBLD CKD-EPI 2021: 106 ML/MIN/1.73M2 (ref 60–?)
ERYTHROCYTE [DISTWIDTH] IN BLOOD BY AUTOMATED COUNT: 11.4 % (ref 11–15)
EST. AVERAGE GLUCOSE BLD GHB EST-MCNC: 97 MG/DL (ref 68–126)
FASTING PATIENT LIPID ANSWER: YES
FASTING STATUS PATIENT QL REPORTED: YES
GLOBULIN PLAS-MCNC: 2.6 G/DL (ref 2–3.5)
GLUCOSE BLD-MCNC: 85 MG/DL (ref 70–99)
HBA1C MFR BLD: 5 % (ref ?–5.7)
HCT VFR BLD AUTO: 38.9 %
HDLC SERPL-MCNC: 58 MG/DL (ref 40–59)
HGB BLD-MCNC: 13.4 G/DL
LDLC SERPL CALC-MCNC: 123 MG/DL (ref ?–100)
MCH RBC QN AUTO: 30 PG (ref 26–34)
MCHC RBC AUTO-ENTMCNC: 34.4 G/DL (ref 31–37)
MCV RBC AUTO: 87 FL
NONHDLC SERPL-MCNC: 133 MG/DL (ref ?–130)
OSMOLALITY SERPL CALC.SUM OF ELEC: 290 MOSM/KG (ref 275–295)
PLATELET # BLD AUTO: 231 10(3)UL (ref 150–450)
POTASSIUM SERPL-SCNC: 4 MMOL/L (ref 3.5–5.1)
PROT SERPL-MCNC: 7.3 G/DL (ref 5.7–8.2)
RBC # BLD AUTO: 4.47 X10(6)UL
SODIUM SERPL-SCNC: 140 MMOL/L (ref 136–145)
T3FREE SERPL-MCNC: 2.75 PG/ML (ref 2.4–4.2)
T4 FREE SERPL-MCNC: 1.4 NG/DL (ref 0.8–1.7)
TRIGL SERPL-MCNC: 51 MG/DL (ref 30–149)
TSI SER-ACNC: <0.008 MIU/ML (ref 0.55–4.78)
VIT D+METAB SERPL-MCNC: 49 NG/ML (ref 30–100)
VLDLC SERPL CALC-MCNC: 9 MG/DL (ref 0–30)
WBC # BLD AUTO: 6.7 X10(3) UL (ref 4–11)

## 2024-08-30 PROCEDURE — 80053 COMPREHEN METABOLIC PANEL: CPT

## 2024-08-30 PROCEDURE — 82306 VITAMIN D 25 HYDROXY: CPT

## 2024-08-30 PROCEDURE — 84481 FREE ASSAY (FT-3): CPT

## 2024-08-30 PROCEDURE — 84439 ASSAY OF FREE THYROXINE: CPT

## 2024-08-30 PROCEDURE — 36415 COLL VENOUS BLD VENIPUNCTURE: CPT

## 2024-08-30 PROCEDURE — 84443 ASSAY THYROID STIM HORMONE: CPT

## 2024-08-30 PROCEDURE — 85027 COMPLETE CBC AUTOMATED: CPT

## 2024-08-30 PROCEDURE — 80061 LIPID PANEL: CPT

## 2024-08-30 PROCEDURE — 83036 HEMOGLOBIN GLYCOSYLATED A1C: CPT

## 2024-08-31 ENCOUNTER — PATIENT MESSAGE (OUTPATIENT)
Dept: FAMILY MEDICINE CLINIC | Facility: CLINIC | Age: 29
End: 2024-08-31

## 2024-09-01 NOTE — TELEPHONE ENCOUNTER
Your thyroid labs suggest that your current dosing is too high. Plan to halve your tablet and we will recheck in 6-8 weeis.  Your electrolytes, kidney function, liver function are normal.  You do not have diabetes. No sign of infection or anemia.  Lipid panel is abnormal with elevated cholesterol and LDL. I recommend working on diet and exercise changes for overall wellness. This includes decreased carb and sugar intake, increased fiber intake, and increased water intake as tolerated, as well as regular exercise.  Please call with any questions.   Written by Becky Marc MD on 8/31/2024  8:26 AM CDT  Seen by patient Ifrah Curran Brit on 8/31/2024  9:49 AM

## 2024-09-01 NOTE — TELEPHONE ENCOUNTER
From: Ifrah Perea  To: Becky Marc  Sent: 8/31/2024 12:06 PM CDT  Subject: Synthroid Rx    Hi Dr. Marc,     Since I need a refill on my sythroid, would you be able to send a script to the online pharmacy that fills my prescription please? The pharmacy is [facility] and should be listed under pharmacies in my chart. Thank you!

## 2024-09-03 RX ORDER — LEVOTHYROXINE SODIUM 88 UG/1
44 TABLET ORAL
Qty: 45 TABLET | Refills: 0 | Status: SHIPPED | OUTPATIENT
Start: 2024-09-03

## 2024-09-03 NOTE — TELEPHONE ENCOUNTER
Please review; protocol failed/ has no protocol      Please see patients MyChart Message     Ifrah TUTTLE Em Rn Triage (supporting Becky Marc MD)3 days ago       Hi Dr. Marc,      Since I need a refill on my sythroid, would you be able to send a script to the online pharmacy that fills my prescription please? The pharmacy is [email or web address] and should be listed under pharmacies in my chart. Thank you!          Requested Prescriptions   Pending Prescriptions Disp Refills    levothyroxine 88 MCG Oral Tab 84 tablet 0     Sig: Take 0.5 tablets (44 mcg total) by mouth before breakfast.       Thyroid Medication Protocol Failed - 9/1/2024  3:15 PM        Failed - Last TSH value is normal     Lab Results   Component Value Date    TSH <0.008 (L) 08/30/2024                 Passed - TSH in past 12 months        Passed - In person appointment or virtual visit in the past 12 mos or appointment in next 3 mos     Recent Outpatient Visits              5 days ago     [facility] in [place] Esp[place]eni, PT    Office Visit    1 week ago Well adult exam    [facility] [place]    Office Visit    1 week ago Acute midline low back pain without sciatica    [facility] - OB[facility]GYN Luisa Mcintyre DO    Office Visit    2 weeks ago     [facility] in [place] Esp[place], PT    Office Visit    1 month ago Postpartum exam (HCC)    [facility] in [place] [facility], PT    Office Visit          Future Appointments         Provider Department Appt Notes    In 2 days Jeni Pederson, PT [facility] in [place] $20 c/p, no limit, no auth  [facility]    In 1 week Jeni Pederson, PT [facility] [place] $20 c/p, no limit, no auth  [facility]    In 2 weeks Jeni Peedrson, PT [facility] in [place] $20 c/p, no limit, no auth  [facility]    In 3  weeks Pederson, Jeni, PT [facility] in [place] $20 c/p, no limit, no auth  [facility]    In 4 weeks [facility] US 5 [facility] [place] for [facility] Thyroid US    In 1 month Pederson, Jeni, PT [facility] in [place] $20 c/p, no limit, no auth  [facility]    In 1 month Pederson, Jeni, PT [facility] in [place] $20 c/p, no limit, no auth  [facility]                       Recent Outpatient Visits              5 days ago     [facility] in [place] Pederson, Jeni, PT    Office Visit    1 week ago Well adult exam    [facility] [place], [facility], Becky Bedolla MD    Office Visit    1 week ago Acute midline low back pain without sciatica    [facility] - OB/GYN Luisa Mcintyre DO    Office Visit    2 weeks ago     [facility] in [place] Pederson, Jeni, PT    Office Visit    1 month ago Postpartum exam (HCC)    [facility] in [place] Pederson, Jeni, PT    Office Visit          Future Appointments         Provider Department Appt Notes    In 2 days Pederson, Jeni, PT [facility] in [place] $20 c/p, no limit, no auth  [facility]    In 1 week Pederson, Jeni, PT [facility] in [place] $20 c/p, no limit, no auth  [facility]    In 2 weeks Pederson, Jeni, PT [facility] in [place] $20 c/p, no limit, no auth  [facility]    In 3 weeks Pederson, Jeni, PT [facility] in [place] $20 c/p, no limit, no auth  [facility]    In 4 weeks [facility] US 5 [facility]trasound - [place] for [facility] Thyroid US    In 1 month Pederson, Jeni, PT [facility] in [place] $20 c/p, no limit, no auth  [facility]    In 1 month Pederson, Jeni, PT [facility] in [place] $20 c/p, no limit, no auth  [facility]

## 2024-09-05 ENCOUNTER — OFFICE VISIT (OUTPATIENT)
Dept: PHYSICAL THERAPY | Age: 29
End: 2024-09-05
Attending: OBSTETRICS & GYNECOLOGY
Payer: COMMERCIAL

## 2024-09-05 PROCEDURE — 97140 MANUAL THERAPY 1/> REGIONS: CPT

## 2024-09-05 PROCEDURE — 97110 THERAPEUTIC EXERCISES: CPT

## 2024-09-05 PROCEDURE — 97112 NEUROMUSCULAR REEDUCATION: CPT

## 2024-09-05 NOTE — PROGRESS NOTES
Diagnosis:   Postpartum exam (HCC) (Z39.2)  Pelvic floor weakness in female (N81.89)         Referring Provider: Luisa Mcintyre  Date of Evaluation:    8/1/24    Precautions:  Breastfeeding Next MD visit:   none scheduled  Date of Surgery: n/a   Insurance Primary/Secondary: [email or web address] / N/A     # Auth Visits: no limit, no auth            Subjective: Pt states that her back is doing better and has not had any really bad days again.  Had some pain yesterday and took Tylenol and did some of her stretches.  She has not noticed any urinary leakage lately.     Pain: 0/10 current, tightness and tenderness.        Objective: Tx: See daily treatment log below  8/29/24:  Tenderness and adhesions bilat lumbar paraspinals and QL L>R  Informed verbal consent given for internal pelvic floor assessment and treatment: yes  Internal Examination   Mons pubis: WNL  Labia majora: WNL  Labia minora: WNL  Urethral meatus: WNL  Introitus: WNL  Perineal body: WNL     Internal Palpation Left Right Comments   Superficial Transverse perineal WNL WNL     Bulbocavernosus WNL WNL     Ischiocavernosus WNL WNL     Deep Transverse Perineal tenderness WNL     Compress Urethrae/Spinc. Urethrovaginalis    not tested Not tested     Pubococcygeus/Pubovaginalis minimal restriction minimal restriction and tenderness     Iliococcygeus minimal restriction minimal restriction and tenderness     Coccygeus not tested not tested     Piriformis not tested not tested     Obturator internus WNL WNL        Pelvic Floor Muscle strength: (PERF= Power/Endurance/Reps/Fast) MMT: 2+/2/3/NT  Accessory Muscle Use: none       Assessment:  Pt with less tenderness at left QL today with STM but still reported some tightness.  Focused on deep breathing with core stabilization activities today with emphasis on pelvic floor muscle relaxation with her inhale and pt tolerated this well.    Goals:   (to be met in 10 visits)  Patient instructed on bladder irritants,  increased water intake to 64 ounces /day     Patient reports a reduction in LEAH from 1-2x/ day to 0x/ day resulting in no leakage into underwear.     Patient is able to perform Levator Ani contraction inverse to diaphragmatic breathing to allow for pelvic brace with ADLs without valsalva.      Patient to demonstrate proper body mechanics for lifting to decrease her LBP with lifting her son in his car seat or from the floor.       Patient reports change in [place] stool #2 to #4 with daily bowel moment without straining and prevention of further pelvic organ prolapse.       Patient understands importance of performing HEP to prevent reoccurrence of symptoms.    Plan: Continue core stabilization with incorporation of diaphragmatic breathing with emphasis on PFM relaxation.  Manual treatment as needed.  Date: 8/15/2024  TX#: 2/10 Date:   8/29/24              TX#: 3/10 Date:  9/5/24               TX#: 4/10 Date:                 TX#: 5/ Date:   Tx#: 6/   NMR: 35 min  Pt education regarding core 4 muscles  Instructions for TA, multifidi and PFM contractions in supine wiht 5\" hold  Instructions and practice of diaphragmatic breathing  Supine hip adduction with ball with deep breathing 10x  Bridge with ball with deep breathing 10x   TA: 15 min  Internal pelvic floor muscles assessment performed and discussed findings and POC  PFM contractions with internal palpation  Reviewed importance of deep breathing with focus on pelvic floor muscle relaxation on inhale NMR:  19 min  Supine diaphragmatic breathing with emphasis on PFM relaxation 10x  Bent knee fall outs with inhale/exhale 10x R/L  Bridges with exhale/inhale 10x  Squats with inhale/exhale 10x  Resisted rows with red TB with deep breathing 15x  Resisted pull downs with red TB with deep breathing 10x       Ther Ex: 10 min  LTR stretch 5\" 10x R/L  Post pelvic tilts 10x  S/L clams 20x R/L Ther Ex: 15 min  Quadruped cat/cow 10x  Child's pose with deep breathing 1  min  Child's pose with side bending 30\" R/L  Left  QL stretch in right side lying over pillow with deep breathing x 1 min Ther Ex: 15 min  Quadruped cat/cow 10x  Child's pose with deep breathing 1 min  Child's pose with side bending 30\" 2x R/L  Left  QL stretch in right side lying over pillow with deep breathing x 1 min  Supine butterfly stretch 30\" 2x  S/L clams 15x R/L  S/L reverse clams 15x R/L        Man Tx: 10 min  STM to bilat lumbar paraspinals and QL  MFR to left QL in side lying Man Tx: 10 min  STM to bilat lumbar paraspinals and QL  MFR to left QL in side lying            HEP: 8/1/24: Increase water intake; Read Postpartum Posture and Body Mechanics Tips  8/15/24: added diaphragmatic breathing, supine hip adduction isometric, bridge with ball, side lying clams  8/29/24: cat/cow, child's pose, child's pose with side bending    Charges: 1 TA, 1 TE, 1 Man       Total Timed Treatment: 44 min  Total Treatment Time: 44 min

## 2024-09-12 ENCOUNTER — OFFICE VISIT (OUTPATIENT)
Dept: PHYSICAL THERAPY | Age: 29
End: 2024-09-12
Attending: OBSTETRICS & GYNECOLOGY
Payer: COMMERCIAL

## 2024-09-12 PROCEDURE — 97110 THERAPEUTIC EXERCISES: CPT

## 2024-09-12 PROCEDURE — 97140 MANUAL THERAPY 1/> REGIONS: CPT

## 2024-09-12 PROCEDURE — 97112 NEUROMUSCULAR REEDUCATION: CPT

## 2024-09-12 NOTE — PROGRESS NOTES
Diagnosis:   Postpartum exam (HCC) (Z39.2)  Pelvic floor weakness in female (N81.89)         Referring Provider: Luisa Mcintyre  Date of Evaluation:    8/1/24    Precautions:  Breastfeeding Next MD visit:   none scheduled  Date of Surgery: n/a   Insurance Primary/Secondary: [employer] / N/A     # Auth Visits: no limit, no auth            Subjective: Pt states that this is the first week that she has not had to take Tylenol  or Ibuprofen for pain.  A couple of days she felt tightness but it improved with stretching.  Pain: 0/10 current, tightness and tenderness.        Objective: Tx: See daily treatment log below  8/29/24:  Tenderness and adhesions bilat lumbar paraspinals and QL L>R  Informed verbal consent given for internal pelvic floor assessment and treatment: yes  Internal Examination   Mons pubis: WNL  Labia majora: WNL  Labia minora: WNL  Urethral meatus: WNL  Introitus: WNL  Perineal body: WNL     Internal Palpation Left Right Comments   Superficial Transverse perineal WNL WNL     Bulbocavernosus WNL WNL     Ischiocavernosus WNL WNL     Deep Transverse Perineal tenderness WNL     Compress Urethrae/Spinc. Urethrovaginalis    not tested Not tested     Pubococcygeus/Pubovaginalis minimal restriction minimal restriction and tenderness     Iliococcygeus minimal restriction minimal restriction and tenderness     Coccygeus not tested not tested     Piriformis not tested not tested     Obturator internus WNL WNL        Pelvic Floor Muscle strength: (PERF= Power/Endurance/Reps/Fast) MMT: 2+/2/3/NT  Accessory Muscle Use: none       Assessment:  Pt had less tenderness and adhesions of bilat QL today.  Progressed core exercises today and pt did well with these without c/o LBP.  She did need occasional cues for deep breathing sequencing.  Updated her HEP to include progressed exercises.     Goals:   (to be met in 10 visits)  Patient instructed on bladder irritants, increased water intake to 64 ounces /day      Patient reports a reduction in LEAH from 1-2x/ day to 0x/ day resulting in no leakage into underwear.     Patient is able to perform Levator Ani contraction inverse to diaphragmatic breathing to allow for pelvic brace with ADLs without valsalva.      Patient to demonstrate proper body mechanics for lifting to decrease her LBP with lifting her son in his car seat or from the floor.       Patient reports change in [place] stool #2 to #4 with daily bowel moment without straining and prevention of further pelvic organ prolapse.       Patient understands importance of performing HEP to prevent reoccurrence of symptoms.    Plan: Continue to progress core stabilization and hip strengthening as pt tolerates.    Date: 8/15/2024  TX#: 2/10 Date:   8/29/24              TX#: 3/10 Date:  9/5/24               TX#: 4/10 Date:  9/12/24               TX#: 5/10 Date:   Tx#: 6/   NMR: 35 min  Pt education regarding core 4 muscles  Instructions for TA, multifidi and PFM contractions in supine wiht 5\" hold  Instructions and practice of diaphragmatic breathing  Supine hip adduction with ball with deep breathing 10x  Bridge with ball with deep breathing 10x   TA: 15 min  Internal pelvic floor muscles assessment performed and discussed findings and POC  PFM contractions with internal palpation  Reviewed importance of deep breathing with focus on pelvic floor muscle relaxation on inhale NMR:  19 min  Supine diaphragmatic breathing with emphasis on PFM relaxation 10x  Bent knee fall outs with inhale/exhale 10x R/L  Bridges with exhale/inhale 10x  Squats with inhale/exhale 10x  Resisted rows with red TB with deep breathing 15x  Resisted pull downs with red TB with deep breathing 10x   NMR: 17 min  Bridges with exhale/inhale 10x  Supine curl up with exhale 10x  Supine march with deep breathing 10x  Supine LE up/up/down/down with deep breathing 10x  Resisted rows with red TB with deep breathing 15x  Resisted pull downs with red TB with deep  breathing 10x      Ther Ex: 10 min  LTR stretch 5\" 10x R/L  Post pelvic tilts 10x  S/L clams 20x R/L Ther Ex: 15 min  Quadruped cat/cow 10x  Child's pose with deep breathing 1 min  Child's pose with side bending 30\" R/L  Left  QL stretch in right side lying over pillow with deep breathing x 1 min Ther Ex: 15 min  Quadruped cat/cow 10x  Child's pose with deep breathing 1 min  Child's pose with side bending 30\" 2x R/L  Left  QL stretch in right side lying over pillow with deep breathing x 1 min  Supine butterfly stretch 30\" 2x  S/L clams 15x R/L  S/L reverse clams 15x R/L   Ther Ex: 15 min  Quadruped cat/cow 10x  Child's pose with deep breathing 1 min  Child's pose with side bending 30\" 2x R/L  Supine butterfly stretch 30\" 2x  S/L clams yellow TB 15x R/L  S/L reverse clams yellow TB 15x R/L  Quadruped hip extension  (knee flexed) 10x R/L     Man Tx: 10 min  STM to bilat lumbar paraspinals and QL  MFR to left QL in side lying Man Tx: 10 min  STM to bilat lumbar paraspinals and QL  MFR to left QL in side lying Man Tx: 10 min  STM to bilat lumbar paraspinals and QL           HEP: 8/1/24: Increase water intake; Read Postpartum Posture and Body Mechanics Tips  8/15/24: added diaphragmatic breathing, supine hip adduction isometric, bridge with ball, side lying clams  8/29/24: cat/cow, child's pose, child's pose with side bending  9/12/24:  Access Code: [email or web address]  Resisted pull downs and rows with red TB, supine curl ups and quadruped hip ext (knee flexed)    Charges: 1 TA, 1 TE, 1 Man       Total Timed Treatment: 42 min  Total Treatment Time: 42 min

## 2024-09-19 ENCOUNTER — OFFICE VISIT (OUTPATIENT)
Dept: PHYSICAL THERAPY | Age: 29
End: 2024-09-19
Attending: OBSTETRICS & GYNECOLOGY
Payer: COMMERCIAL

## 2024-09-19 PROCEDURE — 97110 THERAPEUTIC EXERCISES: CPT

## 2024-09-19 PROCEDURE — 97112 NEUROMUSCULAR REEDUCATION: CPT

## 2024-09-19 NOTE — PROGRESS NOTES
Diagnosis:   Postpartum exam (HCC) (Z39.2)  Pelvic floor weakness in female (N81.89)         Referring Provider: Luisa Mcintyre  Date of Evaluation:    8/1/24    Precautions:  Breastfeeding Next MD visit:   none scheduled  Date of Surgery: n/a   Insurance Primary/Secondary: [employer] / N/A     # Auth Visits: no limit, no auth            Subjective: Pt states that her back is feeling better.  She took a yoga class yesterday and it felt good.    Pain: 0/10 current, tightness        Objective: Tx: See daily treatment log below  8/29/24:  Tenderness and adhesions bilat lumbar paraspinals and QL L>R  Informed verbal consent given for internal pelvic floor assessment and treatment: yes  Internal Examination   Mons pubis: WNL  Labia majora: WNL  Labia minora: WNL  Urethral meatus: WNL  Introitus: WNL  Perineal body: WNL     Internal Palpation Left Right Comments   Superficial Transverse perineal WNL WNL     Bulbocavernosus WNL WNL     Ischiocavernosus WNL WNL     Deep Transverse Perineal tenderness WNL     Compress Urethrae/Spinc. Urethrovaginalis    not tested Not tested     Pubococcygeus/Pubovaginalis minimal restriction minimal restriction and tenderness     Iliococcygeus minimal restriction minimal restriction and tenderness     Coccygeus not tested not tested     Piriformis not tested not tested     Obturator internus WNL WNL        Pelvic Floor Muscle strength: (PERF= Power/Endurance/Reps/Fast) MMT: 2+/2/3/NT  Accessory Muscle Use: none       Assessment:  Progressed core stabilization activities today with focus on deep breathing for core activation and relaxation.  Pt did well with progressions and did not have any c/o LBP.    Goals:   (to be met in 10 visits)  Patient instructed on bladder irritants, increased water intake to 64 ounces /day     Patient reports a reduction in LEAH from 1-2x/ day to 0x/ day resulting in no leakage into underwear.     Patient is able to perform Levator Ani contraction  inverse to diaphragmatic breathing to allow for pelvic brace with ADLs without valsalva.      Patient to demonstrate proper body mechanics for lifting to decrease her LBP with lifting her son in his car seat or from the floor.       Patient reports change in [place] stool #2 to #4 with daily bowel moment without straining and prevention of further pelvic organ prolapse.       Patient understands importance of performing HEP to prevent reoccurrence of symptoms.    Plan: Continue to progress core stabilization and hip strengthening as pt tolerates.    Date:   8/29/24              TX#: 3/10 Date:  9/5/24               TX#: 4/10 Date:  9/12/24               TX#: 5/10 Date: 9/19/24  Tx#: 6/10   TA: 15 min  Internal pelvic floor muscles assessment performed and discussed findings and POC  PFM contractions with internal palpation  Reviewed importance of deep breathing with focus on pelvic floor muscle relaxation on inhale NMR:  19 min  Supine diaphragmatic breathing with emphasis on PFM relaxation 10x  Bent knee fall outs with inhale/exhale 10x R/L  Bridges with exhale/inhale 10x  Squats with inhale/exhale 10x  Resisted rows with red TB with deep breathing 15x  Resisted pull downs with red TB with deep breathing 10x   NMR: 17 min  Bridges with exhale/inhale 10x  Supine curl up with exhale 10x  Supine march with deep breathing 10x  Supine LE up/up/down/down with deep breathing 10x  Resisted rows with red TB with deep breathing 15x  Resisted pull downs with red TB with deep breathing 10x   NMR: 25 min  Bridges with exhale/inhale 10x  Bridge with hip abduction with fitness ring with exhale/inhale 10x  Supine curl up with exhale 10x  Supine 90/90 toe taps 10x   SLR with deep breathing 10x R/L  Seated on green swiss ball lean backs (arms across chest) 10x  Seated on green swiss ball LE marches 10x  Forward plank 10\" 3x  Resisted rows with green TB with deep breathing 15x  Resisted chest press with green TB with deep breathing  15x  Resisted pull downs with green TB with deep breathing 15x  Squats with inhale/exhale 10x  Squats with 7# Med ball with inhale/exhale 10x   Ther Ex: 15 min  Quadruped cat/cow 10x  Child's pose with deep breathing 1 min  Child's pose with side bending 30\" R/L  Left  QL stretch in right side lying over pillow with deep breathing x 1 min Ther Ex: 15 min  Quadruped cat/cow 10x  Child's pose with deep breathing 1 min  Child's pose with side bending 30\" 2x R/L  Left  QL stretch in right side lying over pillow with deep breathing x 1 min  Supine butterfly stretch 30\" 2x  S/L clams 15x R/L  S/L reverse clams 15x R/L   Ther Ex: 15 min  Quadruped cat/cow 10x  Child's pose with deep breathing 1 min  Child's pose with side bending 30\" 2x R/L  Supine butterfly stretch 30\" 2x  S/L clams yellow TB 15x R/L  S/L reverse clams yellow TB 15x R/L  Quadruped hip extension  (knee flexed) 10x R/L Ther Ex: 15 min  Quadruped cat/cow 10x  Child's pose with deep breathing 1 min  Child's pose with side bending 30\" 2x R/L  Supine butterfly stretch 30\" 2x  S/L clams red TB 15x R/L  Quadruped hip extension  (knee flexed) 10x R/L  Quadruped hip abduction/ER 10x R/L  Side lying over green swiss ball QL stretch 30\" R/L  Seated on green swiss ball pelvic circles 10x CW/CCW   Man Tx: 10 min  STM to bilat lumbar paraspinals and QL  MFR to left QL in side lying Man Tx: 10 min  STM to bilat lumbar paraspinals and QL  MFR to left QL in side lying Man Tx: 10 min  STM to bilat lumbar paraspinals and QL ND         HEP: 8/1/24: Increase water intake; Read Postpartum Posture and Body Mechanics Tips  8/15/24: added diaphragmatic breathing, supine hip adduction isometric, bridge with ball, side lying clams  8/29/24: cat/cow, child's pose, child's pose with side bending  9/12/24:  Access Code: [email or web address]  Resisted pull downs and rows with red TB, supine curl ups and quadruped hip ext (knee flexed)    Charges: 2 NMR, 1 TE        Total Timed Treatment: 40  min  Total Treatment Time: 40 min

## 2024-09-26 ENCOUNTER — APPOINTMENT (OUTPATIENT)
Dept: PHYSICAL THERAPY | Age: 29
End: 2024-09-26
Attending: OBSTETRICS & GYNECOLOGY
Payer: COMMERCIAL

## 2024-09-27 ENCOUNTER — OFFICE VISIT (OUTPATIENT)
Dept: PHYSICAL THERAPY | Age: 29
End: 2024-09-27
Attending: OBSTETRICS & GYNECOLOGY
Payer: COMMERCIAL

## 2024-09-27 PROCEDURE — 97112 NEUROMUSCULAR REEDUCATION: CPT

## 2024-09-27 PROCEDURE — 97110 THERAPEUTIC EXERCISES: CPT

## 2024-09-27 NOTE — PROGRESS NOTES
Diagnosis:   Postpartum exam (HCC) (Z39.2)  Pelvic floor weakness in female (N81.89)         Referring Provider: Luisa Mcintyre  Date of Evaluation:    8/1/24    Precautions:  Breastfeeding Next MD visit:   none scheduled  Date of Surgery: n/a   Insurance Primary/Secondary: [employer] / N/A     # Auth Visits: no limit, no auth            Subjective: Pt states that she only had a little low back soreness yesterday.  She thinks this was because Wednesday she used her walking pad at her standing desk and walked the whole work day.  Feeling good again today.  Pain: 0/10 current, tightness        Objective: Tx: See daily treatment log below  8/29/24:  Tenderness and adhesions bilat lumbar paraspinals and QL L>R  Informed verbal consent given for internal pelvic floor assessment and treatment: yes  Internal Examination   Mons pubis: WNL  Labia majora: WNL  Labia minora: WNL  Urethral meatus: WNL  Introitus: WNL  Perineal body: WNL     Internal Palpation Left Right Comments   Superficial Transverse perineal WNL WNL     Bulbocavernosus WNL WNL     Ischiocavernosus WNL WNL     Deep Transverse Perineal tenderness WNL     Compress Urethrae/Spinc. Urethrovaginalis    not tested Not tested     Pubococcygeus/Pubovaginalis minimal restriction minimal restriction and tenderness     Iliococcygeus minimal restriction minimal restriction and tenderness     Coccygeus not tested not tested     Piriformis not tested not tested     Obturator internus WNL WNL        Pelvic Floor Muscle strength: (PERF= Power/Endurance/Reps/Fast) MMT: 2+/2/3/NT  Accessory Muscle Use: none       Assessment:  Updated pt's HEP.  She is no longer having LEAH or pelvic pain.  Her low back has been improved in the last 2 weeks.  If patient continues to feel well, she would like to continue with her HEP and resume her usual work out activities.    Goals:   (to be met in 10 visits)  Patient instructed on bladder irritants, increased water intake to 64  ounces /day Met     Patient reports a reduction in LEAH from 1-2x/ day to 0x/ day resulting in no leakage into underwear. Met     Patient is able to perform Levator Ani contraction inverse to diaphragmatic breathing to allow for pelvic brace with ADLs without valsalva.  Met     Patient to demonstrate proper body mechanics for lifting to decrease her LBP with lifting her son in his car seat or from the floor.  Met     Patient reports change in [place] stool #2 to #4 with daily bowel moment without straining and prevention of further pelvic organ prolapse. Met      Patient understands importance of performing HEP to prevent reoccurrence of symptoms. Met    Plan: Pt to f/u in 2 weeks if needed.  If no longer having symptoms, pt will be discharged from PT and continue with her HEP.    Date:  9/5/24               TX#: 4/10 Date:  9/12/24               TX#: 5/10 Date: 9/19/24  Tx#: 6/10 Date: 9/27/24  Tx#: 7/10   NMR:  19 min  Supine diaphragmatic breathing with emphasis on PFM relaxation 10x  Bent knee fall outs with inhale/exhale 10x R/L  Bridges with exhale/inhale 10x  Squats with inhale/exhale 10x  Resisted rows with red TB with deep breathing 15x  Resisted pull downs with red TB with deep breathing 10x   NMR: 17 min  Bridges with exhale/inhale 10x  Supine curl up with exhale 10x  Supine march with deep breathing 10x  Supine LE up/up/down/down with deep breathing 10x  Resisted rows with red TB with deep breathing 15x  Resisted pull downs with red TB with deep breathing 10x   NMR: 25 min  Bridges with exhale/inhale 10x  Bridge with hip abduction with fitness ring with exhale/inhale 10x  Supine curl up with exhale 10x  Supine 90/90 toe taps 10x   SLR with deep breathing 10x R/L  Seated on green swiss ball lean backs (arms across chest) 10x  Seated on green swiss ball LE marches 10x  Forward plank 10\" 3x  Resisted rows with green TB with deep breathing 15x  Resisted chest press with green TB with deep breathing  15x  Resisted pull downs with green TB with deep breathing 15x  Squats with inhale/exhale 10x  Squats with 7# Med ball with inhale/exhale 10x NMR: 25 min  Bridges with march 10x with exhale/inhale  Bridge with hip abduction with fitness ring with exhale/inhale 10x  Supine curl up with exhale 15x  Supine 90/90 toe taps 10x   SLR with leg circles CW/CCW 10x R/L  Seated on green swiss ball lean backs (arms across chest) 10x  Seated on green swiss ball LE marches 10x  Forward plank 15\" 3x  Resisted rows with green TB with deep breathing 20x  Resisted chest press with green TB with deep breathing 20x  Resisted pull downs with green TB with deep breathing 15x  Squats with 7# Med ball with inhale/exhale 20x   Ther Ex: 15 min  Quadruped cat/cow 10x  Child's pose with deep breathing 1 min  Child's pose with side bending 30\" 2x R/L  Left  QL stretch in right side lying over pillow with deep breathing x 1 min  Supine butterfly stretch 30\" 2x  S/L clams 15x R/L  S/L reverse clams 15x R/L   Ther Ex: 15 min  Quadruped cat/cow 10x  Child's pose with deep breathing 1 min  Child's pose with side bending 30\" 2x R/L  Supine butterfly stretch 30\" 2x  S/L clams yellow TB 15x R/L  S/L reverse clams yellow TB 15x R/L  Quadruped hip extension  (knee flexed) 10x R/L Ther Ex: 15 min  Quadruped cat/cow 10x  Child's pose with deep breathing 1 min  Child's pose with side bending 30\" 2x R/L  Supine butterfly stretch 30\" 2x  S/L clams red TB 15x R/L  Quadruped hip extension  (knee flexed) 10x R/L  Quadruped hip abduction/ER 10x R/L  Side lying over green swiss ball QL stretch 30\" R/L  Seated on green swiss ball pelvic circles 10x CW/CCW Ther Ex: 15 min  Quadruped cat/cow 10x  Child's pose with side bending 30\" 2x R/L  S/L clams red TB 25x R/L  Quadruped hip extension  (knee flexed) 10x R/L  Quadruped hip abduction/ER 10x R/L  Seated on green swiss ball pelvic circles 10x CW/CCW   Man Tx: 10 min  STM to bilat lumbar paraspinals and QL  MFR to left  QL in side lying Man Tx: 10 min  STM to bilat lumbar paraspinals and QL ND          HEP: 8/1/24: Increase water intake; Read Postpartum Posture and Body Mechanics Tips  8/15/24: added diaphragmatic breathing, supine hip adduction isometric, bridge with ball, side lying clams  8/29/24: cat/cow, child's pose, child's pose with side bending  9/12/24:  Access Code: [email or web address]  Resisted pull downs and rows with red TB, supine curl ups and quadruped hip ext (knee flexed)  9/27/24 Access Code: [email or web address]  - Supine 90/90 Alternating Toe Touch  - 1 x daily - 1 sets - 10 reps  - Small Range Straight Leg Raise  - 1 x daily - 1 sets  - Marching Bridge  - 1 x daily - 1 sets - 10 reps  - Standard Plank  - 1 x daily    Charges: 2 NMR, 1 TE        Total Timed Treatment: 40 min  Total Treatment Time: 40 min

## 2024-10-02 ENCOUNTER — HOSPITAL ENCOUNTER (OUTPATIENT)
Dept: ULTRASOUND IMAGING | Facility: HOSPITAL | Age: 29
Discharge: HOME | End: 2024-10-02
Attending: STUDENT IN AN ORGANIZED HEALTH CARE EDUCATION/TRAINING PROGRAM
Payer: COMMERCIAL

## 2024-10-02 DIAGNOSIS — E04.1 THYROID NODULE: ICD-10-CM

## 2024-10-02 PROCEDURE — 76536 US EXAM OF HEAD AND NECK: CPT | Performed by: STUDENT IN AN ORGANIZED HEALTH CARE EDUCATION/TRAINING PROGRAM

## 2024-10-03 ENCOUNTER — APPOINTMENT (OUTPATIENT)
Dept: PHYSICAL THERAPY | Age: 29
End: 2024-10-03
Attending: OBSTETRICS & GYNECOLOGY
Payer: COMMERCIAL

## 2024-10-10 ENCOUNTER — APPOINTMENT (OUTPATIENT)
Dept: PHYSICAL THERAPY | Age: 29
End: 2024-10-10
Attending: OBSTETRICS & GYNECOLOGY
Payer: COMMERCIAL

## 2024-10-21 ENCOUNTER — LAB ENCOUNTER (OUTPATIENT)
Dept: LAB | Age: 29
End: 2024-10-21
Attending: STUDENT IN AN ORGANIZED HEALTH CARE EDUCATION/TRAINING PROGRAM
Payer: COMMERCIAL

## 2024-10-21 DIAGNOSIS — E06.3 HYPOTHYROIDISM DUE TO HASHIMOTO'S THYROIDITIS: Primary | ICD-10-CM

## 2024-10-21 DIAGNOSIS — R79.89 LOW TSH LEVEL: ICD-10-CM

## 2024-10-21 LAB
T4 FREE SERPL-MCNC: 1 NG/DL (ref 0.8–1.7)
TSI SER-ACNC: 10.83 MIU/ML (ref 0.55–4.78)

## 2024-10-21 PROCEDURE — 36415 COLL VENOUS BLD VENIPUNCTURE: CPT

## 2024-10-21 PROCEDURE — 84439 ASSAY OF FREE THYROXINE: CPT

## 2024-10-21 PROCEDURE — 84443 ASSAY THYROID STIM HORMONE: CPT

## 2024-10-21 RX ORDER — LEVOTHYROXINE SODIUM 75 UG/1
75 TABLET ORAL
Qty: 90 TABLET | Refills: 3 | Status: SHIPPED | OUTPATIENT
Start: 2024-10-21

## 2024-10-24 ENCOUNTER — PATIENT MESSAGE (OUTPATIENT)
Dept: OBGYN CLINIC | Facility: CLINIC | Age: 29
End: 2024-10-24

## 2024-10-24 ENCOUNTER — TELEPHONE (OUTPATIENT)
Dept: OBGYN CLINIC | Facility: CLINIC | Age: 29
End: 2024-10-24

## 2024-10-24 RX ORDER — DICLOXACILLIN SODIUM 500 MG/1
500 CAPSULE ORAL 4 TIMES DAILY
Qty: 40 CAPSULE | Refills: 0 | Status: SHIPPED | OUTPATIENT
Start: 2024-10-24

## 2024-10-24 NOTE — TELEPHONE ENCOUNTER
Incoming call from patient following up on a [employer] message sent to Dr Mcintyre this morning. Patient states that she believes she may have developed mastitis and would like to have a prescription as soon as possible. Patient states that she is experiencing left breast engorgement and a lot of pain. She tried pumping but the pain did not go away. Patient states she was having flu like symptoms.     Please assist.

## 2024-10-24 NOTE — TELEPHONE ENCOUNTER
Called pt c/o pain at the left breast, started over night, taken tylenol but no improvement.  Temp at 98.4 but having chills, flu-like symptoms, pain at far lower outer quadrant (denies redness), feels hard.  Whole breast hurts left side.      DR. Mars informed and order obtained for Dicloxacillin 500 mg QID for 10 days.    Called pt informed of medication, to call if not feeling better after 48 hours and informed of website [email or web address] for breastfeeding.  Pt agrees.    Ifrah Garcia 10 Ob Clinical Staff (supporting Luisa Mcintyre DO)53 minutes ago (7:35 AM)       Hi Dr. Mcintyre,     Last night I woke up several times with pain in my left breast and a feeling of engorgement. I needed up having to pump to relieve the pressure but the pain was still there. The area that is painful is also very hot to the touch. I also have had chills all night and a headache. No fever yet but I did take Tylenol for the pain. Please let me know if I need to come in or if you can recommend/prescribe something asap.      Thank you,  Ifrah

## 2024-12-19 ENCOUNTER — PATIENT MESSAGE (OUTPATIENT)
Dept: FAMILY MEDICINE CLINIC | Facility: CLINIC | Age: 29
End: 2024-12-19

## 2024-12-19 DIAGNOSIS — E06.3 HYPOTHYROIDISM DUE TO HASHIMOTO'S THYROIDITIS: Primary | ICD-10-CM

## 2024-12-19 RX ORDER — LEVOTHYROXINE SODIUM 75 MCG
75 TABLET ORAL
Qty: 90 TABLET | Refills: 3 | Status: CANCELLED
Start: 2024-12-19

## 2024-12-19 NOTE — TELEPHONE ENCOUNTER
Kaylahart message sent.     Pharmacy  [id number] - [place], [place] - [address] [phone], [phone]      Disp Refills Start End    levothyroxine 75 MCG Oral Tab 90 tablet 3 10/21/2024 --    Sig - Route: Take 1 tablet (75 mcg total) by mouth before breakfast. - Oral    Sent to pharmacy as: Levothyroxine Sodium 75 MCG Oral Tablet (Synthroid)    E-Prescribing Status: Receipt confirmed by pharmacy (10/21/2024 10:41 PM CDT)

## 2024-12-20 RX ORDER — LEVOTHYROXINE SODIUM 75 UG/1
75 TABLET ORAL
Qty: 90 TABLET | Refills: 3 | Status: SHIPPED | OUTPATIENT
Start: 2024-12-20

## 2024-12-20 NOTE — TELEPHONE ENCOUNTER
I called patient. She is still taking 80 mcgs of Levothyroxine. She is planning on getting her blood test completed upcoming Monday. She asked that we send the 75 mcg to the specific pharmacy \"[facility].\" She was advised on the results from 10/21/24 the following:      Your thyroid level indicates that current halved dosing needs to be adjusted. Let's increase and recheck in 6-8 weeks. Please contact us with any questions. (This is the date the dose was changed to 75 mcgs) But, since she has so many of the 80 mcgs and the difference was only 5 mcgs, she just continued to take the 80 mcgs.)              I explained at length, she needs to be taking 75 mcgs and not the 80 mcgs. I advised since she has about 2 weeks of the pills left to go get the blood drawn and we will not be sending any new pills/ dosing until we see the results and recommendations per her provider.      Dr. Marc, any further advice? thanks

## 2024-12-23 ENCOUNTER — PATIENT MESSAGE (OUTPATIENT)
Dept: FAMILY MEDICINE CLINIC | Facility: CLINIC | Age: 29
End: 2024-12-23

## 2024-12-23 ENCOUNTER — LAB ENCOUNTER (OUTPATIENT)
Dept: LAB | Age: 29
End: 2024-12-23
Attending: STUDENT IN AN ORGANIZED HEALTH CARE EDUCATION/TRAINING PROGRAM
Payer: COMMERCIAL

## 2024-12-23 DIAGNOSIS — E06.3 HYPOTHYROIDISM DUE TO HASHIMOTO'S THYROIDITIS: ICD-10-CM

## 2024-12-23 LAB — TSI SER-ACNC: 1.82 UIU/ML (ref 0.55–4.78)

## 2024-12-23 PROCEDURE — 84443 ASSAY THYROID STIM HORMONE: CPT

## 2024-12-23 PROCEDURE — 36415 COLL VENOUS BLD VENIPUNCTURE: CPT

## 2024-12-24 NOTE — TELEPHONE ENCOUNTER
Patient has been taking Synthroid 88 mcg not 75 mcg. Also see [employer] messages from 12/19/24. Please advise. Medication pended for your review and approval.       Disp Refills Start End    levothyroxine (SYNTHROID) 75 MCG Oral Tab 90 tablet 3 12/20/2024 --    Sig - Route: Take 1 tablet (75 mcg total) by mouth before breakfast. - Oral    Sent to pharmacy as: Levothyroxine Sodium 75 MCG Oral Tablet (Synthroid)    E-Prescribing Status: Receipt confirmed by pharmacy (12/20/2024 11:10 AM CST)

## 2024-12-25 RX ORDER — LEVOTHYROXINE SODIUM 88 UG/1
88 TABLET ORAL
Qty: 90 TABLET | Refills: 3 | Status: SHIPPED | OUTPATIENT
Start: 2024-12-25

## 2025-01-14 ENCOUNTER — OFFICE VISIT (OUTPATIENT)
Dept: FAMILY MEDICINE CLINIC | Facility: CLINIC | Age: 30
End: 2025-01-14

## 2025-01-14 VITALS
TEMPERATURE: 99 F | HEIGHT: 64 IN | SYSTOLIC BLOOD PRESSURE: 106 MMHG | DIASTOLIC BLOOD PRESSURE: 71 MMHG | OXYGEN SATURATION: 99 % | WEIGHT: 124 LBS | HEART RATE: 110 BPM | RESPIRATION RATE: 16 BRPM | BODY MASS INDEX: 21.17 KG/M2

## 2025-01-14 DIAGNOSIS — R05.9 COUGH, UNSPECIFIED TYPE: Primary | ICD-10-CM

## 2025-01-14 DIAGNOSIS — Z20.822 SUSPECTED COVID-19 VIRUS INFECTION: ICD-10-CM

## 2025-01-14 LAB
CONTROL LINE PRESENT WITH A CLEAR BACKGROUND (YES/NO): YES YES/NO
COVID19 BINAX NOW ANTIGEN: NOT DETECTED
KIT LOT #: NORMAL NUMERIC
OPERATOR ID: NORMAL
STREP GRP A CUL-SCR: NEGATIVE

## 2025-01-14 PROCEDURE — 87637 SARSCOV2&INF A&B&RSV AMP PRB: CPT

## 2025-01-14 RX ORDER — KETOCONAZOLE 20 MG/ML
1 SHAMPOO, SUSPENSION TOPICAL
COMMUNITY
Start: 2024-11-13

## 2025-01-14 RX ORDER — LEVOTHYROXINE SODIUM 88 UG/1
88 TABLET ORAL
COMMUNITY
Start: 2025-01-14

## 2025-01-14 RX ORDER — ALBUTEROL SULFATE 90 UG/1
2 INHALANT RESPIRATORY (INHALATION) EVERY 4 HOURS PRN
Qty: 18 G | Refills: 0 | Status: SHIPPED | OUTPATIENT
Start: 2025-01-14

## 2025-01-14 NOTE — PROGRESS NOTES
HPI:    Patient ID: Ifrah Perea is a 29 year old female.    HPI     Here in office with complaint of cough, nasal congestion, headache, sinus pain/pressure, and chills, started on Saturday.  Denies fever, shortness of breath, vomiting, or diarrhea.  Has been taking Tylenol alternating with Advil as needed with mild improvement.  States son and  also sick with similar symptoms.  Did not take home COVID-19 test.      Review of Systems   Constitutional:  Positive for chills. Negative for fever.   HENT:  Positive for congestion, sinus pressure and sinus pain.    Respiratory:  Positive for cough. Negative for shortness of breath.    Cardiovascular: Negative.    Gastrointestinal: Negative.  Negative for diarrhea and vomiting.   Skin: Negative.    Neurological:  Positive for headaches.   Psychiatric/Behavioral: Negative.              Current Outpatient Medications   Medication Sig Dispense Refill    ketoconazole 2 % External Shampoo Apply 1 Application topically 3 (three) times a week.      levothyroxine 88 MCG Oral Tab Take 1 tablet (88 mcg total) by mouth before breakfast.      albuterol 108 (90 Base) MCG/ACT Inhalation Aero Soln Inhale 2 puffs into the lungs every 4 (four) hours as needed. 18 g 0    oseltamivir 75 MG Oral Cap Take 1 capsule (75 mg total) by mouth 2 (two) times daily for 5 days. 10 capsule 0     Allergies:Allergies[1]   /71   Pulse 110   Temp 99.2 °F (37.3 °C) (Temporal)   Resp 16   Ht 5' 4\" (1.626 m)   Wt 124 lb (56.2 kg)   SpO2 99%   Breastfeeding Yes   BMI 21.28 kg/m²   Body mass index is 21.28 kg/m².  PHYSICAL EXAM:   Physical Exam  Vitals reviewed.   Constitutional:       General: She is not in acute distress.     Appearance: Normal appearance. She is not ill-appearing.   HENT:      Right Ear: Tympanic membrane, ear canal and external ear normal. There is no impacted cerumen.      Left Ear: Tympanic membrane, ear canal and external ear normal. There is no impacted  cerumen.      Nose: Congestion present.      Mouth/Throat:      Mouth: Mucous membranes are moist.      Pharynx: Posterior oropharyngeal erythema present. No oropharyngeal exudate.   Eyes:      General:         Right eye: No discharge.         Left eye: No discharge.      Conjunctiva/sclera: Conjunctivae normal.   Cardiovascular:      Rate and Rhythm: Normal rate and regular rhythm.      Heart sounds: Normal heart sounds. No murmur heard.     No friction rub. No gallop.   Pulmonary:      Effort: Pulmonary effort is normal. No respiratory distress.      Breath sounds: No stridor. No wheezing, rhonchi or rales.      Comments: Decreased breath sounds bilateral lobes  Chest:      Chest wall: No tenderness.   Skin:     General: Skin is warm.      Findings: No rash.   Neurological:      General: No focal deficit present.      Mental Status: She is alert and oriented to person, place, and time.   Psychiatric:         Mood and Affect: Mood normal.         Behavior: Behavior normal.         Thought Content: Thought content normal.         Judgment: Judgment normal.                ASSESSMENT/PLAN:   1. Cough, unspecified type  -Rapid strep test negative  -COVID-19 test negative  -albuterol 108 (90 Base) MCG/ACT Inhalation Aero Soln, Inhale 2 puffs into the lungs every 4 (four) hours as needed. - Inhalation   - POC COVID19 BinaxNOW Antigen  - POC Rapid Strep [07568]    2. Suspected COVID-19 virus infection  -Testing ordered as listed below, will await results  - SARS-CoV-2/Flu A and B/RSV by PCR ([facility]) [E] *Collect in Office!; Future  - SARS-CoV-2/Flu A and B/RSV by PCR ([facility]) [E] *Collect in Office!      Orders Placed This Encounter   Procedures    POC Rapid Strep [15420]    POC COVID19 BinaxNOW Antigen    SARS-CoV-2/Flu A and B/RSV by PCR ([facility]) [E] *Collect in Office!       Meds This Visit:  Requested Prescriptions     Signed Prescriptions Disp Refills    albuterol 108 (90 Base) MCG/ACT Inhalation Aero Soln 18 g 0      Sig: Inhale 2 puffs into the lungs every 4 (four) hours as needed.       Imaging & Referrals:  None         ID#[id number]         [1]   Allergies  Allergen Reactions    Sulfa Antibiotics HIVES

## 2025-01-15 LAB
FLUAV + FLUBV RNA SPEC NAA+PROBE: DETECTED
FLUAV + FLUBV RNA SPEC NAA+PROBE: NOT DETECTED
RSV RNA SPEC NAA+PROBE: NOT DETECTED
SARS-COV-2 RNA RESP QL NAA+PROBE: NOT DETECTED

## 2025-01-17 ENCOUNTER — TELEPHONE (OUTPATIENT)
Dept: FAMILY MEDICINE CLINIC | Facility: CLINIC | Age: 30
End: 2025-01-17

## 2025-01-18 NOTE — TELEPHONE ENCOUNTER
Medication has been pended.     Zackery Marc,     I am waiting for my sythroid order through the online pharmacy to be delivered but I am almost completely out of pills and my order will not be delivered until 1/23. I just requested a refill of the Levothyroxine to my local [facility] pharmacy so that I can  some medication to have until my sythroid order arrives late next week. If you could please send the order to [facility] that would be appreciated so that I don’t run out of medication.      My phone number is [phone] if you need to contact me.      Thank you.      -Ifrah

## 2025-01-21 RX ORDER — LEVOTHYROXINE SODIUM 88 UG/1
88 TABLET ORAL
Qty: 7 TABLET | Refills: 0 | Status: SHIPPED | OUTPATIENT
Start: 2025-01-21

## 2025-01-21 NOTE — TELEPHONE ENCOUNTER
Spoke to patient. Asked if still wants refill sent to pharmacy. Advised that for short medication coverage may have to pay out of pocket. Patient verbalized understanding. 7 day supply sent to pharmacy on file.

## 2025-01-30 NOTE — PROGRESS NOTES
Pt is a 29 year old female admitted to [facility].     Chief Complaint   Patient presents with    Scheduled Induction      Pt is  39w6d intra-uterine pregnancy.  History obtained, consents signed. Oriented to room, staff, and plan of care.   none

## 2025-07-24 ENCOUNTER — OFFICE VISIT (OUTPATIENT)
Dept: OBGYN CLINIC | Facility: CLINIC | Age: 30
End: 2025-07-24
Payer: COMMERCIAL

## 2025-07-24 ENCOUNTER — LAB ENCOUNTER (OUTPATIENT)
Dept: LAB | Age: 30
End: 2025-07-24
Attending: OBSTETRICS & GYNECOLOGY
Payer: COMMERCIAL

## 2025-07-24 VITALS
DIASTOLIC BLOOD PRESSURE: 76 MMHG | HEIGHT: 64 IN | BODY MASS INDEX: 21.51 KG/M2 | SYSTOLIC BLOOD PRESSURE: 100 MMHG | WEIGHT: 126 LBS

## 2025-07-24 DIAGNOSIS — Z31.69 ENCOUNTER FOR PRECONCEPTION CONSULTATION: ICD-10-CM

## 2025-07-24 DIAGNOSIS — Z01.419 VISIT FOR GYNECOLOGIC EXAMINATION: ICD-10-CM

## 2025-07-24 DIAGNOSIS — Z01.419 VISIT FOR GYNECOLOGIC EXAMINATION: Primary | ICD-10-CM

## 2025-07-24 LAB
T4 FREE SERPL-MCNC: 1.3 NG/DL (ref 0.8–1.7)
TSI SER-ACNC: 2.03 UIU/ML (ref 0.55–4.78)

## 2025-07-24 PROCEDURE — 99395 PREV VISIT EST AGE 18-39: CPT | Performed by: OBSTETRICS & GYNECOLOGY

## 2025-07-24 PROCEDURE — 36415 COLL VENOUS BLD VENIPUNCTURE: CPT

## 2025-07-24 PROCEDURE — 84439 ASSAY OF FREE THYROXINE: CPT

## 2025-07-24 PROCEDURE — 84443 ASSAY THYROID STIM HORMONE: CPT

## 2025-07-24 RX ORDER — TRETINOIN 0.25 MG/G
CREAM TOPICAL
COMMUNITY
Start: 2025-03-19

## 2025-07-24 RX ORDER — NEOMYCIN SULFATE, POLYMYXIN B SULFATE AND DEXAMETHASONE 3.5; 10000; 1 MG/ML; [USP'U]/ML; MG/ML
SUSPENSION/ DROPS OPHTHALMIC
COMMUNITY
Start: 2025-06-04

## 2025-07-24 NOTE — PROGRESS NOTES
ANNUAL GYN EXAM  [facility]    CHIEF COMPLAINT:    Chief Complaint   Patient presents with    Fertility     Pt states 14 months pp and is thinking of baby # 2    Annual      HISTORY OF PRESENT ILLNESS:   Ifrah Perea is a 30 year old female   who presents for annual well woman visit.  She is feeling well.    Period returned in April, when stopped breastfeeding.    Patient's last menstrual period was 2025 (exact date).  Q 27-32 days.   First 3 were very heavy. This past month was a little better.  Mild cramping with periods.    Is having worse ovulation symptoms now. More cramping / backache around ovulation.      No pain with sex.    Little amount of stress incontinence  No constipation    Exercise: regular but not ideal amount, walking pad during work - so usually gets 10,000 steps  Diet: pretty good  Mood: good      PAST MEDICAL HISTORY:   Past Medical History[1]     PAST SURGICAL HISTORY:   Past Surgical History[2]     PAST OB HISTORY:  OB History    Para Term  AB Living   1 1 1 0 0 1   SAB IAB Ectopic Multiple Live Births   0 0 0 0 1      # Outcome Date GA Lbr Marco Antonio/2nd Weight Sex Type Anes PTL Lv   1 Term 24 39w6d 03:19 / 00:43 7 lb 10.1 oz (3.46 kg) M NORMAL SPONT EPI N ASHOK      Complications: Late decelerations       CURRENT MEDICATIONS:    Medications - Current[3]    ALLERGIES:  Allergies[4]    SOCIAL HISTORY:  Social Hx on file[5]    FAMILY HISTORY:  Family History[6]  ASSESSMENTS:  REVIEW OF SYSTEMS:  CONSTITUTIONAL:  negative for fevers, chills and sweats    EYES:  negative for  blurred vision and visual disturbance  RESPIRATORY:  negative for  cough and shortness of breath  CARDIOVASCULAR:  negative for  chest pain, palpitations  GASTROINTESTINAL:  No constipation/diarrhea, no pain  GENITOURINARY:  See History of Present Illness  INTEGUMENT/BREAST: Breast: no masses, no nipple discharge  ENDOCRINE:  negative for acne, constipation, diarrhea, cold intolerance, heat  intolerance, fatigue, hair loss, weight gain and weight loss  MUSCULOSKELETAL:  negative for joint pain  NEUROLOGICAL:  negative for dizziness/lightheadedness and headaches  BEHAVIOR/PSYCH:  Negative for depressed mood, anhedonia and anxiety    PHYSICAL EXAM  Patient's last menstrual period was 07/11/2025 (exact date).   Vitals:    07/24/25 1141   BP: 100/76   Weight: 126 lb (57.2 kg)   Height: 64\"       CONSTITUTIONAL: Awake, alert, cooperative, no apparent distress, and appears stated age   NECK: Supple, symmetrical, trachea midline, no adenopathy, thyroid symmetric, not enlarged and no tenderness  LUNGS: no excess work of breathing  ABDOMEN: Soft, non-distended, non-tender, no masses palpated    CHEST/BREASTS: Breasts symmetrical, skin without lesion(s), no nipple retraction or dimpling, no nipple discharge, no masses palpated, no axillary or supraclavicular adenopathy  GENITAL/URINARY:    External Genitalia:  General appearance; normal, Hair distribution; normal, Lesions absent   Urethral Meatus:  Lesions absent, Prolapse absent  Bladder:  Tenderness absent, Cystocele absent  Vagina:  Discharge absent, Lesions absent, Pelvic support normal  Cervix:  Lesions absent, Discharge absent, Tenderness absent  Uterus:  Size normal, Masses absent, Tenderness absent  Adnexa:  Masses absent, Tenderness absent  Anus/Perineum:  Lesions absent    MUSCULOSKELETAL: There is no redness, warmth, or swelling of the joints.  Tone is normal.  NEUROLOGIC: Patient is awake, alert and oriented to name, place and time. Casual gait is normal.  SKIN: no bruising or bleeding and no rashes  PSYCHIATRIC: Behavior:  Appropriate  Mood:  appropriate  ASSESSMENT AND PLAN:  1. Visit for gynecologic examination  - CBE and pelvic exam today. Self breast awareness discussed.  - pap due 2029  - TSH and Free T4; Future    2. Preconception consultation  - stop retinol creams, start PNV  - check thyroid labs to ensure is well controlled.  - try for  pregnancy, if no success in 1 year, f/u for more evaluation, otherwise f/u when + pregnancy test.       follow up 1 yr or as needed  Luisa Mcintyre DO             [1]   Past Medical History:   Acne    Hypothyroidism   [2]   Past Surgical History:  Procedure Laterality Date    Patient denies any surgical history     [3]   Current Outpatient Medications:     tretinoin 0.025 % External Cream, APPLY AT BEDTIME A PEA-SIZED AMOUNT TO FACE AND THIN LAYERS, Disp: , Rfl:     levothyroxine 88 MCG Oral Tab, Take 1 tablet (88 mcg total) by mouth before breakfast., Disp: 7 tablet, Rfl: 0    ketoconazole 2 % External Shampoo, Apply 1 Application topically 3 (three) times a week., Disp: , Rfl:     Neomycin-Polymyxin-Dexameth 3.5-80603-1.1 Ophthalmic Suspension, APPLY TWO DROPS IN THE AFFECTED EYE 4 TIMES A DAY (Patient not taking: Reported on 7/24/2025), Disp: , Rfl:   [4]   Allergies  Allergen Reactions    Sulfa Antibiotics HIVES   [5]   Social History  Socioeconomic History    Marital status:    Tobacco Use    Smoking status: Never     Passive exposure: Never    Smokeless tobacco: Never   Vaping Use    Vaping status: Never Used   Substance and Sexual Activity    Alcohol use: Yes     Comment: socially on weekends    Drug use: Never    Sexual activity: Yes   Other Topics Concern    Blood Transfusions No    Caffeine Concern No    Stress Concern No    Weight Concern No    Special Diet No    Exercise No    Seat Belt No   [6]   Family History  Problem Relation Age of Onset    Thyroid Disorder Mother     Thyroid Disorder Father     Hypertension Father     No Known Problems Sister     Osteoporosis Maternal Grandmother     Obesity Maternal Grandfather     No Known Problems Paternal Grandmother     Cancer Paternal Grandfather     Other (lymphoma) Paternal Grandfather     Heart Attack Paternal Uncle 60

## (undated) NOTE — LETTER
[facility]  Administration of Anesthesia  I, Ifrah Perea agree to be cared for by a physician anesthesiologist alone and/or with a nurse anesthetist, who is specially trained to monitor me and give me medicine to put me to sleep or keep me comfortable during my procedure    I understand that my anesthesiologist and/or anesthetist is not an employee or agent of [facility] or [employer]. He or she works for [facility]    As the patient asking for anesthesia services, I agree to:  Allow the anesthesiologist (anesthesia doctor) to give me medicine and do additional procedures as necessary. Some examples are: Starting or using an “IV” to give me medicine, fluids or blood during my procedure, and having a breathing tube placed to help me breathe when I’m asleep (intubation). In the event that my heart stops working properly, I understand that my anesthesiologist will make every effort to sustain my life, unless otherwise directed by [facility] Do Not Resuscitate documents.  Tell my anesthesia doctor before my procedure:  If I am pregnant.  The last time that I ate or drank.  iii. All of the medicines I take (including prescriptions, herbal supplements, and pills I can buy without a prescription (including street drugs/illegal medications). Failure to inform my anesthesiologist about these medicines may increase my risk of anesthetic complications.  iv.If I am allergic to anything or have had a reaction to anesthesia before.  I understand how the anesthesia medicine will help me (benefits).  I understand that with any type of anesthesia medicine there are risks:  The most common risks are: nausea, vomiting, sore throat, muscle soreness, damage to my eyes, mouth, or teeth (from breathing tube placement).  Rare risks include: remembering what happened during my procedure, allergic reactions to medications, injury to my airway, heart, lungs, vision, nerves, or  muscles and in extremely rare instances death.  My doctor has explained to me other choices available to me for my care (alternatives).  Pregnant Patients (“epidural”):  I understand that the risks of having an epidural (medicine given into my back to help control pain during labor), include itching, low blood pressure, difficulty urinating, headache or slowing of the baby’s heart. Very rare risks include infection, bleeding, seizure, irregular heart rhythms and nerve injury.  Regional Anesthesia (“spinal”, “epidural”, & “nerve blocks”):  I understand that rare but potential complications include headache, bleeding, infection, seizure, irregular heart rhythms, and nerve injury.    _____________________________________________________________________________  Patient (or Representative) Signature/Relationship to Patient  Date   Time    _____________________________________________________________________________   Name (if used)    Language/Organization   Time    _____________________________________________________________________________  Nurse Anesthetist Signature     Date   Time  _____________________________________________________________________________  Anesthesiologist Signature     Date   Time  I have discussed the procedure and information above with the patient (or patient’s representative) and answered their questions. The patient or their representative has agreed to have anesthesia services.    _____________________________________________________________________________  Witness        Date   Time  I have verified that the signature is that of the patient or patient’s representative, and that it was signed before the procedure  Patient Name: Ifrah Perea     : 1995                 Printed: 2024 at 5:14 AM    Medical Record #: [id number]                                            Page 1 of 1  ----------ANESTHESIA CONSENT----------

## (undated) NOTE — LETTER
Dear New Mom,    We hope you are doing well. If, for any reason, you have questions or concerns about your health or your baby’s health, please contact your provider or your pediatrician or family medicine physician regarding your baby.     At [facility], we feel that postpartum support is very important for new families. Please see the enclosed new parent support flyer that lists support programs and resources with both in-person and online options.     Additionally, our Breastfeeding Centers at [facility] and [facility] in [place], offer outpatient visits with our International Board-Certified Lactation   Consultants (IBCLCs) for any breastfeeding concerns or questions you may have.    For issues related to stress, anxiety or depression, we have a Nurturing Mom support group that meets both in-person or online.  There’s also a 24-hour Mom’s Line where you can request a phone call from a clinical therapist for assistance for postpartum depression.    We encourage you to take advantage of these programs and resources as you recover from childbirth and learn to care for your new infant.    Best wishes,    [facility]            [id number]